# Patient Record
Sex: FEMALE | Race: WHITE | NOT HISPANIC OR LATINO | Employment: OTHER | URBAN - NONMETROPOLITAN AREA
[De-identification: names, ages, dates, MRNs, and addresses within clinical notes are randomized per-mention and may not be internally consistent; named-entity substitution may affect disease eponyms.]

---

## 2023-06-02 PROBLEM — I25.10 CAD (CORONARY ATHEROSCLEROTIC DISEASE): Status: ACTIVE | Noted: 2023-06-02

## 2023-06-02 PROBLEM — F10.11 HISTORY OF ALCOHOL ABUSE: Status: ACTIVE | Noted: 2023-06-02

## 2023-06-02 PROBLEM — I10 HYPERTENSION: Status: ACTIVE | Noted: 2023-06-02

## 2023-06-02 PROBLEM — R91.8 LUNG NODULES: Status: ACTIVE | Noted: 2023-06-02

## 2023-06-02 PROBLEM — F17.200 NICOTINE DEPENDENCE: Status: ACTIVE | Noted: 2023-06-02

## 2023-06-02 PROBLEM — E78.00 HYPERCHOLESTEREMIA: Status: ACTIVE | Noted: 2023-06-02

## 2023-06-02 PROBLEM — K70.10 ALCOHOLIC HEPATITIS WITHOUT ASCITES (MULTI): Status: ACTIVE | Noted: 2023-06-02

## 2023-06-02 PROBLEM — E55.9 VITAMIN D DEFICIENCY: Status: ACTIVE | Noted: 2023-06-02

## 2023-06-02 RX ORDER — CHLORTHALIDONE 50 MG/1
1 TABLET ORAL DAILY
COMMUNITY
Start: 2016-10-14 | End: 2023-06-07 | Stop reason: SDUPTHER

## 2023-06-02 RX ORDER — LOVASTATIN 20 MG/1
1 TABLET ORAL NIGHTLY
COMMUNITY
Start: 2022-05-09 | End: 2023-06-07 | Stop reason: SINTOL

## 2023-06-07 ENCOUNTER — OFFICE VISIT (OUTPATIENT)
Dept: PRIMARY CARE | Facility: CLINIC | Age: 68
End: 2023-06-07
Payer: MEDICARE

## 2023-06-07 VITALS
HEART RATE: 73 BPM | TEMPERATURE: 96.9 F | WEIGHT: 129.4 LBS | DIASTOLIC BLOOD PRESSURE: 62 MMHG | SYSTOLIC BLOOD PRESSURE: 122 MMHG | BODY MASS INDEX: 22.21 KG/M2 | OXYGEN SATURATION: 96 %

## 2023-06-07 DIAGNOSIS — F17.219 CIGARETTE NICOTINE DEPENDENCE WITH NICOTINE-INDUCED DISORDER: Primary | ICD-10-CM

## 2023-06-07 DIAGNOSIS — I25.10 CORONARY ATHEROSCLEROSIS DUE TO LIPID RICH PLAQUE: ICD-10-CM

## 2023-06-07 DIAGNOSIS — E78.00 HYPERCHOLESTEREMIA: ICD-10-CM

## 2023-06-07 DIAGNOSIS — I10 HYPERTENSION, UNSPECIFIED TYPE: ICD-10-CM

## 2023-06-07 DIAGNOSIS — I25.83 CORONARY ATHEROSCLEROSIS DUE TO LIPID RICH PLAQUE: ICD-10-CM

## 2023-06-07 DIAGNOSIS — Z12.11 SCREENING FOR COLON CANCER: ICD-10-CM

## 2023-06-07 DIAGNOSIS — R91.8 LUNG NODULES: ICD-10-CM

## 2023-06-07 DIAGNOSIS — Z78.9 STATIN INTOLERANCE: ICD-10-CM

## 2023-06-07 PROCEDURE — 99406 BEHAV CHNG SMOKING 3-10 MIN: CPT | Performed by: INTERNAL MEDICINE

## 2023-06-07 PROCEDURE — 1160F RVW MEDS BY RX/DR IN RCRD: CPT | Performed by: INTERNAL MEDICINE

## 2023-06-07 PROCEDURE — 3078F DIAST BP <80 MM HG: CPT | Performed by: INTERNAL MEDICINE

## 2023-06-07 PROCEDURE — 1159F MED LIST DOCD IN RCRD: CPT | Performed by: INTERNAL MEDICINE

## 2023-06-07 PROCEDURE — 3074F SYST BP LT 130 MM HG: CPT | Performed by: INTERNAL MEDICINE

## 2023-06-07 PROCEDURE — 99214 OFFICE O/P EST MOD 30 MIN: CPT | Performed by: INTERNAL MEDICINE

## 2023-06-07 RX ORDER — BRIMONIDINE TARTRATE AND TIMOLOL MALEATE 2; 5 MG/ML; MG/ML
1 SOLUTION OPHTHALMIC EVERY 12 HOURS SCHEDULED
COMMUNITY
Start: 2023-05-20 | End: 2023-10-04 | Stop reason: WASHOUT

## 2023-06-07 RX ORDER — EVOLOCUMAB 140 MG/ML
140 INJECTION, SOLUTION SUBCUTANEOUS
Qty: 2 ML | Refills: 11 | Status: SHIPPED | OUTPATIENT
Start: 2023-06-07 | End: 2023-12-07 | Stop reason: SDUPTHER

## 2023-06-07 RX ORDER — CHLORTHALIDONE 50 MG/1
50 TABLET ORAL DAILY
Qty: 90 TABLET | Refills: 1 | Status: SHIPPED | OUTPATIENT
Start: 2023-06-07 | End: 2023-10-04 | Stop reason: SINTOL

## 2023-06-07 ASSESSMENT — ENCOUNTER SYMPTOMS
ARTHRALGIAS: 1
SORE THROAT: 0
DIZZINESS: 0
HYPERTENSION: 1
DIFFICULTY URINATING: 0
FEVER: 0
PALPITATIONS: 0
DIARRHEA: 0
FATIGUE: 0
COUGH: 0
WHEEZING: 0
BRUISES/BLEEDS EASILY: 0
UNEXPECTED WEIGHT CHANGE: 0
BLOOD IN STOOL: 0
SINUS PAIN: 0
ABDOMINAL PAIN: 0
HEADACHES: 0

## 2023-06-07 NOTE — PROGRESS NOTES
"Subjective   Patient ID: Dasha Carvajal is a 67 y.o. female who presents for Hypertension, Hyperlipidemia, Coronary Artery Disease, and Medication Problem (Lovastatin caused all over muscle aches and nausea, would like to discuss repatha).    - Statin intolerance patient developed severe muscle weakness aches discontinuing medication lovastatin  Patient high risk coronary artery disease  - Patient need to start on Repatha new prescription provided sent to specialty pharmacy follow-up results closely  - Needs screening colonoscopy refer patient to gastroenterology  - Nicotine dependency  \"I spent 3 minutes counseling patient about need for smoking/tobacco cessation and how I can support efforts when patient is ready to quit. Discussed nicotine replacement therapy, Varenicline, Bupropion, hypnosis, support groups, and accupunture as potential options.  Patient currently has no signs or symptoms of tobacco related disease.\".  - Repeat low-dose CAT scan in August 2023, order placed today  -hypertension takes the chlorthalidone daily  -Reevaluate patient in 3 months arrange with physical exam next appointment    Hypertension  Pertinent negatives include no chest pain, headaches or palpitations.   Hyperlipidemia  Pertinent negatives include no chest pain.   Coronary Artery Disease  Pertinent negatives include no chest pain, dizziness, leg swelling or palpitations. Risk factors include hyperlipidemia.          Review of Systems   Constitutional:  Negative for fatigue, fever and unexpected weight change.   HENT:  Negative for congestion, ear discharge, ear pain, mouth sores, sinus pain and sore throat.    Eyes:  Negative for visual disturbance.   Respiratory:  Negative for cough and wheezing.    Cardiovascular:  Negative for chest pain, palpitations and leg swelling.   Gastrointestinal:  Negative for abdominal pain, blood in stool and diarrhea.   Genitourinary:  Negative for difficulty urinating.   Musculoskeletal:  " Positive for arthralgias.   Skin:  Negative for rash.   Neurological:  Negative for dizziness and headaches.   Hematological:  Does not bruise/bleed easily.   Psychiatric/Behavioral:  Negative for behavioral problems.    All other systems reviewed and are negative.      Objective   No results found for: HGBA1C   /62   Pulse 73   Temp 36.1 °C (96.9 °F)   Wt 58.7 kg (129 lb 6.4 oz)   SpO2 96%   BMI 22.21 kg/m²   Lab Results   Component Value Date    WBC 6.4 11/29/2022    HGB 12.8 11/29/2022    HCT 39.6 11/29/2022     11/29/2022    CHOL 268 (H) 11/29/2022    TRIG 82 11/29/2022    HDL 96.0 11/29/2022    ALT 12 11/29/2022    AST 21 11/29/2022     11/29/2022    K 3.7 11/29/2022     11/29/2022    CREATININE 0.97 11/29/2022    BUN 21 11/29/2022    CO2 29 11/29/2022    TSH 2.51 11/29/2022     par   Physical Exam  Vitals and nursing note reviewed.   Constitutional:       Appearance: Normal appearance.   HENT:      Head: Normocephalic.      Nose: Nose normal.   Eyes:      Conjunctiva/sclera: Conjunctivae normal.      Pupils: Pupils are equal, round, and reactive to light.   Cardiovascular:      Rate and Rhythm: Regular rhythm.   Pulmonary:      Effort: Pulmonary effort is normal.      Breath sounds: Normal breath sounds.   Abdominal:      General: Abdomen is flat.      Palpations: Abdomen is soft.   Musculoskeletal:      Cervical back: Neck supple.   Skin:     General: Skin is warm.   Neurological:      General: No focal deficit present.      Mental Status: She is oriented to person, place, and time.   Psychiatric:         Mood and Affect: Mood normal.         Assessment/Plan   Dasha was seen today for hypertension, hyperlipidemia, coronary artery disease and medication problem.  Diagnoses and all orders for this visit:  Cigarette nicotine dependence with nicotine-induced disorder (Primary)  -     CT lung screening low dose; Future  Hypertension, unspecified type  -     chlorthalidone  "(Hygroton) 50 mg tablet; Take 1 tablet (50 mg) by mouth once daily.  Lung nodules  -     CT lung screening low dose; Future  Hypercholesteremia  -     evolocumab (Repatha SureClick) 140 mg/mL injection; Inject 1 mL (140 mg) under the skin every 14 (fourteen) days.  Coronary atherosclerosis due to lipid rich plaque  -     evolocumab (Repatha SureClick) 140 mg/mL injection; Inject 1 mL (140 mg) under the skin every 14 (fourteen) days.  Statin intolerance  -     evolocumab (Repatha SureClick) 140 mg/mL injection; Inject 1 mL (140 mg) under the skin every 14 (fourteen) days.  Screening for colon cancer  -     Referral to Gastroenterology; Future  Other orders  -     Follow Up In Primary Care; Future   - Statin intolerance patient developed severe muscle weakness aches discontinuing medication lovastatin  Patient high risk coronary artery disease  - Patient need to start on Repatha new prescription provided sent to specialty pharmacy follow-up results closely  - Needs screening colonoscopy refer patient to gastroenterology  - Nicotine dependency  \"I spent 3 minutes counseling patient about need for smoking/tobacco cessation and how I can support efforts when patient is ready to quit. Discussed nicotine replacement therapy, Varenicline, Bupropion, hypnosis, support groups, and accupunture as potential options.  Patient currently has no signs or symptoms of tobacco related disease.\".  - Repeat low-dose CAT scan in August 2023, order placed today  -hypertension takes the chlorthalidone daily  -Reevaluate patient in 3 months arrange with physical exam next appointment  "

## 2023-07-10 DIAGNOSIS — E78.00 HYPERCHOLESTEREMIA: ICD-10-CM

## 2023-07-12 ENCOUNTER — LAB (OUTPATIENT)
Dept: LAB | Facility: LAB | Age: 68
End: 2023-07-12
Payer: MEDICARE

## 2023-07-12 DIAGNOSIS — E78.00 HYPERCHOLESTEREMIA: ICD-10-CM

## 2023-07-12 LAB
CHOLESTEROL (MG/DL) IN SER/PLAS: 236 MG/DL (ref 0–199)
CHOLESTEROL IN HDL (MG/DL) IN SER/PLAS: 82.2 MG/DL
CHOLESTEROL/HDL RATIO: 2.9
LDL: 131 MG/DL (ref 0–99)
TRIGLYCERIDE (MG/DL) IN SER/PLAS: 116 MG/DL (ref 0–149)
VLDL: 23 MG/DL (ref 0–40)

## 2023-07-12 PROCEDURE — 36415 COLL VENOUS BLD VENIPUNCTURE: CPT

## 2023-07-12 PROCEDURE — 80061 LIPID PANEL: CPT

## 2023-09-07 ENCOUNTER — APPOINTMENT (OUTPATIENT)
Dept: PRIMARY CARE | Facility: CLINIC | Age: 68
End: 2023-09-07
Payer: MEDICARE

## 2023-10-04 ENCOUNTER — OFFICE VISIT (OUTPATIENT)
Dept: PRIMARY CARE | Facility: CLINIC | Age: 68
End: 2023-10-04
Payer: MEDICARE

## 2023-10-04 VITALS
WEIGHT: 132.6 LBS | OXYGEN SATURATION: 98 % | BODY MASS INDEX: 22.76 KG/M2 | DIASTOLIC BLOOD PRESSURE: 84 MMHG | HEART RATE: 64 BPM | TEMPERATURE: 96.7 F | SYSTOLIC BLOOD PRESSURE: 154 MMHG

## 2023-10-04 DIAGNOSIS — Z79.899 HIGH RISK MEDICATION USE: ICD-10-CM

## 2023-10-04 DIAGNOSIS — I25.83 CORONARY ATHEROSCLEROSIS DUE TO LIPID RICH PLAQUE: ICD-10-CM

## 2023-10-04 DIAGNOSIS — E53.8 VITAMIN B12 DEFICIENCY: ICD-10-CM

## 2023-10-04 DIAGNOSIS — F17.219 CIGARETTE NICOTINE DEPENDENCE WITH NICOTINE-INDUCED DISORDER: ICD-10-CM

## 2023-10-04 DIAGNOSIS — R91.8 LUNG NODULES: ICD-10-CM

## 2023-10-04 DIAGNOSIS — I25.10 CORONARY ATHEROSCLEROSIS DUE TO LIPID RICH PLAQUE: ICD-10-CM

## 2023-10-04 DIAGNOSIS — Z78.9 STATIN INTOLERANCE: ICD-10-CM

## 2023-10-04 DIAGNOSIS — I10 HYPERTENSION, UNSPECIFIED TYPE: Primary | ICD-10-CM

## 2023-10-04 DIAGNOSIS — E78.00 HYPERCHOLESTEREMIA: ICD-10-CM

## 2023-10-04 DIAGNOSIS — Z12.31 ENCOUNTER FOR SCREENING MAMMOGRAM FOR MALIGNANT NEOPLASM OF BREAST: ICD-10-CM

## 2023-10-04 DIAGNOSIS — R53.83 OTHER FATIGUE: ICD-10-CM

## 2023-10-04 DIAGNOSIS — Z78.0 MENOPAUSE: ICD-10-CM

## 2023-10-04 PROCEDURE — 1159F MED LIST DOCD IN RCRD: CPT | Performed by: INTERNAL MEDICINE

## 2023-10-04 PROCEDURE — 1160F RVW MEDS BY RX/DR IN RCRD: CPT | Performed by: INTERNAL MEDICINE

## 2023-10-04 PROCEDURE — 3079F DIAST BP 80-89 MM HG: CPT | Performed by: INTERNAL MEDICINE

## 2023-10-04 PROCEDURE — 99406 BEHAV CHNG SMOKING 3-10 MIN: CPT | Performed by: INTERNAL MEDICINE

## 2023-10-04 PROCEDURE — 99214 OFFICE O/P EST MOD 30 MIN: CPT | Performed by: INTERNAL MEDICINE

## 2023-10-04 PROCEDURE — 3077F SYST BP >= 140 MM HG: CPT | Performed by: INTERNAL MEDICINE

## 2023-10-04 RX ORDER — DORZOLAMIDE HYDROCHLORIDE AND TIMOLOL MALEATE 20; 5 MG/ML; MG/ML
SOLUTION/ DROPS OPHTHALMIC
COMMUNITY
Start: 2023-09-25

## 2023-10-04 RX ORDER — LOSARTAN POTASSIUM AND HYDROCHLOROTHIAZIDE 12.5; 5 MG/1; MG/1
1 TABLET ORAL DAILY
Qty: 90 TABLET | Refills: 3 | Status: SHIPPED | OUTPATIENT
Start: 2023-10-04 | End: 2024-10-03

## 2023-10-04 ASSESSMENT — ENCOUNTER SYMPTOMS
COUGH: 0
PALPITATIONS: 0
DIZZINESS: 0
ABDOMINAL PAIN: 0
HYPERTENSION: 1
SINUS PAIN: 0
DIARRHEA: 0
BLOOD IN STOOL: 0
FEVER: 0
WHEEZING: 0
DIFFICULTY URINATING: 0
HEADACHES: 0
UNEXPECTED WEIGHT CHANGE: 0
SORE THROAT: 0
FATIGUE: 0
BRUISES/BLEEDS EASILY: 0
ARTHRALGIAS: 0

## 2023-10-04 NOTE — PROGRESS NOTES
"Subjective   Patient ID: Dasha Carvajal is a 67 y.o. female who presents for Hypertension, Hyperlipidemia, Coronary Artery Disease, and Flu Vaccine (Decline).    - Statin intolerance, patient now doing very well takes Repatha no complaints  Need to proceed with lipid profile  Patient developed severe muscle weakness aches discontinuing medication lovastatin  Patient high risk coronary artery disease  -Not taking her blood pressure medication because causing her leg cramps patient counseled about discontinuing chlorthalidone  Switch patient to losartan hydrochlorothiazide 50/12.5 mg daily  - Needs screening colonoscopy refer patient to gastroenterology  - Nicotine dependency  \"I spent 3 minutes counseling patient about need for smoking/tobacco cessation and how I can support efforts when patient is ready to quit. Discussed nicotine replacement therapy, Varenicline, Bupropion, hypnosis, support groups, and accupunture as potential options.  Patient currently has no signs or symptoms of tobacco related disease.\".  -Lung nodule, repeat low-dose CAT scan in July 2024, order placed today  -Needs bone densitometry  Medicare physical next appointment  Patient declined flu vaccine    Hypertension  Pertinent negatives include no chest pain, headaches or palpitations.   Hyperlipidemia  Pertinent negatives include no chest pain.   Coronary Artery Disease  Pertinent negatives include no chest pain, dizziness, leg swelling or palpitations. Risk factors include hyperlipidemia.          Review of Systems   Constitutional:  Negative for fatigue, fever and unexpected weight change.   HENT:  Negative for congestion, ear discharge, ear pain, mouth sores, sinus pain and sore throat.    Eyes:  Negative for visual disturbance.   Respiratory:  Negative for cough and wheezing.    Cardiovascular:  Negative for chest pain, palpitations and leg swelling.   Gastrointestinal:  Negative for abdominal pain, blood in stool and diarrhea. " "  Genitourinary:  Negative for difficulty urinating.   Musculoskeletal:  Negative for arthralgias.   Skin:  Negative for rash.   Neurological:  Negative for dizziness and headaches.   Hematological:  Does not bruise/bleed easily.   Psychiatric/Behavioral:  Negative for behavioral problems.    All other systems reviewed and are negative.      Objective   No results found for: \"HGBA1C\"   /84   Pulse 64   Temp 35.9 °C (96.7 °F)   Wt 60.1 kg (132 lb 9.6 oz)   SpO2 98%   BMI 22.76 kg/m²   Lab Results   Component Value Date    WBC 6.4 11/29/2022    HGB 12.8 11/29/2022    HCT 39.6 11/29/2022     11/29/2022    CHOL 236 (H) 07/12/2023    TRIG 116 07/12/2023    HDL 82.2 07/12/2023    ALT 12 11/29/2022    AST 21 11/29/2022     11/29/2022    K 3.7 11/29/2022     11/29/2022    CREATININE 0.97 11/29/2022    BUN 21 11/29/2022    CO2 29 11/29/2022    TSH 2.51 11/29/2022     par   Physical Exam  Vitals and nursing note reviewed.   Constitutional:       Appearance: Normal appearance.   HENT:      Head: Normocephalic.      Nose: Nose normal.   Eyes:      Conjunctiva/sclera: Conjunctivae normal.      Pupils: Pupils are equal, round, and reactive to light.   Cardiovascular:      Rate and Rhythm: Regular rhythm.   Pulmonary:      Effort: Pulmonary effort is normal.      Breath sounds: Normal breath sounds.   Abdominal:      General: Abdomen is flat.      Palpations: Abdomen is soft.   Musculoskeletal:      Cervical back: Neck supple.   Skin:     General: Skin is warm.   Neurological:      General: No focal deficit present.      Mental Status: She is oriented to person, place, and time.   Psychiatric:         Mood and Affect: Mood normal.         Assessment/Plan   Dasha was seen today for hypertension, hyperlipidemia, coronary artery disease and flu vaccine.  Diagnoses and all orders for this visit:  Hypertension, unspecified type (Primary)  -     losartan-hydrochlorothiazide (Hyzaar) 50-12.5 mg tablet; " "Take 1 tablet by mouth once daily.  -     Comprehensive Metabolic Panel; Future  High risk medication use  -     CBC and Auto Differential; Future  Hypercholesteremia  -     Lipid Panel; Future  Other fatigue  -     TSH with reflex to Free T4 if abnormal; Future  Vitamin B12 deficiency  -     Vitamin B12; Future  Menopause  -     XR DEXA bone density; Future  Encounter for screening mammogram for malignant neoplasm of breast  -     BI mammo bilateral screening tomosynthesis; Future  Lung nodules  Statin intolerance  Coronary atherosclerosis due to lipid rich plaque  Cigarette nicotine dependence with nicotine-induced disorder  Other orders  -     Follow Up In Primary Care - Medicare Annual; Future   - Statin intolerance, patient now doing very well takes Repatha no complaints  Need to proceed with lipid profile  Patient developed severe muscle weakness aches discontinuing medication lovastatin  Patient high risk coronary artery disease  -Not taking her blood pressure medication because causing her leg cramps patient counseled about discontinuing chlorthalidone  Switch patient to losartan hydrochlorothiazide 50/12.5 mg daily  - Needs screening colonoscopy refer patient to gastroenterology  - Nicotine dependency  \"I spent 3 minutes counseling patient about need for smoking/tobacco cessation and how I can support efforts when patient is ready to quit. Discussed nicotine replacement therapy, Varenicline, Bupropion, hypnosis, support groups, and accupunture as potential options.  Patient currently has no signs or symptoms of tobacco related disease.\".  -Lung nodule, repeat low-dose CAT scan in July 2024, order placed today  -Needs bone densitometry  Medicare physical next appointment  Patient declined flu vaccine  "

## 2023-10-24 ENCOUNTER — SPECIALTY PHARMACY (OUTPATIENT)
Dept: PHARMACY | Facility: CLINIC | Age: 68
End: 2023-10-24

## 2023-10-24 DIAGNOSIS — Z78.9 STATIN INTOLERANCE: ICD-10-CM

## 2023-10-24 RX ORDER — EVOLOCUMAB 140 MG/ML
INJECTION, SOLUTION SUBCUTANEOUS
Qty: 2 ML | Refills: 11 | Status: SHIPPED | OUTPATIENT
Start: 2023-10-24 | End: 2024-10-23

## 2023-10-26 ENCOUNTER — SPECIALTY PHARMACY (OUTPATIENT)
Dept: PHARMACY | Facility: CLINIC | Age: 68
End: 2023-10-26

## 2023-10-26 ENCOUNTER — PHARMACY VISIT (OUTPATIENT)
Dept: PHARMACY | Facility: CLINIC | Age: 68
End: 2023-10-26
Payer: MEDICARE

## 2023-10-26 PROCEDURE — RXMED WILLOW AMBULATORY MEDICATION CHARGE

## 2023-11-21 ENCOUNTER — PHARMACY VISIT (OUTPATIENT)
Dept: PHARMACY | Facility: CLINIC | Age: 68
End: 2023-11-21
Payer: MEDICARE

## 2023-11-21 PROCEDURE — RXMED WILLOW AMBULATORY MEDICATION CHARGE

## 2023-12-04 ENCOUNTER — LAB (OUTPATIENT)
Dept: LAB | Facility: LAB | Age: 68
End: 2023-12-04
Payer: MEDICARE

## 2023-12-04 DIAGNOSIS — I10 HYPERTENSION, UNSPECIFIED TYPE: ICD-10-CM

## 2023-12-04 DIAGNOSIS — E78.00 HYPERCHOLESTEREMIA: ICD-10-CM

## 2023-12-04 DIAGNOSIS — Z79.899 HIGH RISK MEDICATION USE: ICD-10-CM

## 2023-12-04 DIAGNOSIS — R53.83 OTHER FATIGUE: ICD-10-CM

## 2023-12-04 DIAGNOSIS — E53.8 VITAMIN B12 DEFICIENCY: ICD-10-CM

## 2023-12-04 LAB
ALBUMIN SERPL BCP-MCNC: 4.5 G/DL (ref 3.4–5)
ALP SERPL-CCNC: 75 U/L (ref 33–136)
ALT SERPL W P-5'-P-CCNC: 13 U/L (ref 7–45)
ANION GAP SERPL CALC-SCNC: 13 MMOL/L (ref 10–20)
AST SERPL W P-5'-P-CCNC: 20 U/L (ref 9–39)
BASOPHILS # BLD AUTO: 0.05 X10*3/UL (ref 0–0.1)
BASOPHILS NFR BLD AUTO: 0.6 %
BILIRUB SERPL-MCNC: 0.4 MG/DL (ref 0–1.2)
BUN SERPL-MCNC: 17 MG/DL (ref 6–23)
CALCIUM SERPL-MCNC: 9.5 MG/DL (ref 8.6–10.3)
CHLORIDE SERPL-SCNC: 102 MMOL/L (ref 98–107)
CHOLEST SERPL-MCNC: 149 MG/DL (ref 0–199)
CHOLESTEROL/HDL RATIO: 1.7
CO2 SERPL-SCNC: 28 MMOL/L (ref 21–32)
CREAT SERPL-MCNC: 0.87 MG/DL (ref 0.5–1.05)
EOSINOPHIL # BLD AUTO: 0.06 X10*3/UL (ref 0–0.7)
EOSINOPHIL NFR BLD AUTO: 0.7 %
ERYTHROCYTE [DISTWIDTH] IN BLOOD BY AUTOMATED COUNT: 12.1 % (ref 11.5–14.5)
GFR SERPL CREATININE-BSD FRML MDRD: 72 ML/MIN/1.73M*2
GLUCOSE SERPL-MCNC: 87 MG/DL (ref 74–99)
HCT VFR BLD AUTO: 41.2 % (ref 36–46)
HDLC SERPL-MCNC: 90.1 MG/DL
HGB BLD-MCNC: 13.5 G/DL (ref 12–16)
IMM GRANULOCYTES # BLD AUTO: 0.02 X10*3/UL (ref 0–0.7)
IMM GRANULOCYTES NFR BLD AUTO: 0.2 % (ref 0–0.9)
LDLC SERPL CALC-MCNC: 35 MG/DL
LYMPHOCYTES # BLD AUTO: 3.57 X10*3/UL (ref 1.2–4.8)
LYMPHOCYTES NFR BLD AUTO: 41.4 %
MCH RBC QN AUTO: 33.1 PG (ref 26–34)
MCHC RBC AUTO-ENTMCNC: 32.8 G/DL (ref 32–36)
MCV RBC AUTO: 101 FL (ref 80–100)
MONOCYTES # BLD AUTO: 0.59 X10*3/UL (ref 0.1–1)
MONOCYTES NFR BLD AUTO: 6.8 %
NEUTROPHILS # BLD AUTO: 4.33 X10*3/UL (ref 1.2–7.7)
NEUTROPHILS NFR BLD AUTO: 50.3 %
NON HDL CHOLESTEROL: 59 MG/DL (ref 0–149)
NRBC BLD-RTO: 0 /100 WBCS (ref 0–0)
PLATELET # BLD AUTO: 405 X10*3/UL (ref 150–450)
POTASSIUM SERPL-SCNC: 4.8 MMOL/L (ref 3.5–5.3)
PROT SERPL-MCNC: 7.4 G/DL (ref 6.4–8.2)
RBC # BLD AUTO: 4.08 X10*6/UL (ref 4–5.2)
SODIUM SERPL-SCNC: 138 MMOL/L (ref 136–145)
T4 FREE SERPL-MCNC: 0.46 NG/DL (ref 0.61–1.12)
TRIGL SERPL-MCNC: 120 MG/DL (ref 0–149)
TSH SERPL-ACNC: 4.78 MIU/L (ref 0.44–3.98)
VIT B12 SERPL-MCNC: 344 PG/ML (ref 211–911)
VLDL: 24 MG/DL (ref 0–40)
WBC # BLD AUTO: 8.6 X10*3/UL (ref 4.4–11.3)

## 2023-12-04 PROCEDURE — 36415 COLL VENOUS BLD VENIPUNCTURE: CPT

## 2023-12-04 PROCEDURE — 82607 VITAMIN B-12: CPT

## 2023-12-07 ENCOUNTER — OFFICE VISIT (OUTPATIENT)
Dept: PRIMARY CARE | Facility: CLINIC | Age: 68
End: 2023-12-07
Payer: MEDICARE

## 2023-12-07 VITALS
HEART RATE: 72 BPM | WEIGHT: 133.8 LBS | DIASTOLIC BLOOD PRESSURE: 78 MMHG | TEMPERATURE: 96.5 F | HEIGHT: 64 IN | SYSTOLIC BLOOD PRESSURE: 138 MMHG | BODY MASS INDEX: 22.84 KG/M2 | OXYGEN SATURATION: 96 %

## 2023-12-07 DIAGNOSIS — I25.10 CORONARY ATHEROSCLEROSIS DUE TO LIPID RICH PLAQUE: ICD-10-CM

## 2023-12-07 DIAGNOSIS — I25.83 CORONARY ATHEROSCLEROSIS DUE TO LIPID RICH PLAQUE: ICD-10-CM

## 2023-12-07 DIAGNOSIS — E03.9 HYPOTHYROIDISM, UNSPECIFIED TYPE: Primary | ICD-10-CM

## 2023-12-07 DIAGNOSIS — Z78.9 STATIN INTOLERANCE: ICD-10-CM

## 2023-12-07 DIAGNOSIS — Z13.89 SCREENING FOR MULTIPLE CONDITIONS: ICD-10-CM

## 2023-12-07 DIAGNOSIS — Z00.00 ROUTINE GENERAL MEDICAL EXAMINATION AT HEALTH CARE FACILITY: ICD-10-CM

## 2023-12-07 DIAGNOSIS — R91.8 LUNG NODULES: ICD-10-CM

## 2023-12-07 DIAGNOSIS — R53.83 OTHER FATIGUE: ICD-10-CM

## 2023-12-07 DIAGNOSIS — F17.219 CIGARETTE NICOTINE DEPENDENCE WITH NICOTINE-INDUCED DISORDER: ICD-10-CM

## 2023-12-07 DIAGNOSIS — E78.00 HYPERCHOLESTEREMIA: ICD-10-CM

## 2023-12-07 PROCEDURE — 1160F RVW MEDS BY RX/DR IN RCRD: CPT | Performed by: INTERNAL MEDICINE

## 2023-12-07 PROCEDURE — 3078F DIAST BP <80 MM HG: CPT | Performed by: INTERNAL MEDICINE

## 2023-12-07 PROCEDURE — 3075F SYST BP GE 130 - 139MM HG: CPT | Performed by: INTERNAL MEDICINE

## 2023-12-07 PROCEDURE — 1159F MED LIST DOCD IN RCRD: CPT | Performed by: INTERNAL MEDICINE

## 2023-12-07 PROCEDURE — 99214 OFFICE O/P EST MOD 30 MIN: CPT | Performed by: INTERNAL MEDICINE

## 2023-12-07 PROCEDURE — 1170F FXNL STATUS ASSESSED: CPT | Performed by: INTERNAL MEDICINE

## 2023-12-07 PROCEDURE — 99406 BEHAV CHNG SMOKING 3-10 MIN: CPT | Performed by: INTERNAL MEDICINE

## 2023-12-07 PROCEDURE — G0439 PPPS, SUBSEQ VISIT: HCPCS | Performed by: INTERNAL MEDICINE

## 2023-12-07 RX ORDER — LEVOTHYROXINE SODIUM 25 UG/1
25 TABLET ORAL DAILY
Qty: 90 TABLET | Refills: 1 | Status: SHIPPED | OUTPATIENT
Start: 2023-12-07 | End: 2024-04-09 | Stop reason: SDUPTHER

## 2023-12-07 ASSESSMENT — PATIENT HEALTH QUESTIONNAIRE - PHQ9
SUM OF ALL RESPONSES TO PHQ9 QUESTIONS 1 AND 2: 0
1. LITTLE INTEREST OR PLEASURE IN DOING THINGS: NOT AT ALL
2. FEELING DOWN, DEPRESSED OR HOPELESS: NOT AT ALL

## 2023-12-07 ASSESSMENT — ENCOUNTER SYMPTOMS
COUGH: 0
DIZZINESS: 0
UNEXPECTED WEIGHT CHANGE: 0
SORE THROAT: 0
FATIGUE: 1
ABDOMINAL PAIN: 0
DIFFICULTY URINATING: 0
DIARRHEA: 0
HYPERTENSION: 1
PALPITATIONS: 0
BRUISES/BLEEDS EASILY: 0
OCCASIONAL FEELINGS OF UNSTEADINESS: 0
ARTHRALGIAS: 0
DEPRESSION: 0
BLOOD IN STOOL: 0
HEADACHES: 0
LOSS OF SENSATION IN FEET: 0
FEVER: 0
WHEEZING: 0
SINUS PAIN: 0

## 2023-12-07 ASSESSMENT — ACTIVITIES OF DAILY LIVING (ADL)
TAKING_MEDICATION: INDEPENDENT
MANAGING_FINANCES: INDEPENDENT
BATHING: INDEPENDENT
DOING_HOUSEWORK: INDEPENDENT
GROCERY_SHOPPING: INDEPENDENT
DRESSING: INDEPENDENT

## 2023-12-07 NOTE — PROGRESS NOTES
"Subjective   Patient ID: Dasha Carvajal is a 68 y.o. female who presents for Hypertension (Some days bp is high), Medicare Annual Wellness Visit Subsequent, and Results.    HPI       Review of Systems    Objective   No results found for: \"HGBA1C\"   /78   Pulse 72   Temp 35.8 °C (96.5 °F)   Ht 1.626 m (5' 4\")   Wt 60.7 kg (133 lb 12.8 oz)   SpO2 96%   BMI 22.97 kg/m²     Physical Exam    Assessment/Plan   There are no diagnoses linked to this encounter.   "

## 2023-12-07 NOTE — PROGRESS NOTES
"Subjective   Reason for Visit: Dasha Carvajal is an 68 y.o. female here for a Medicare Wellness visit.     Past Medical, Surgical, and Family History reviewed and updated in chart.    Reviewed all medications by prescribing practitioner or clinical pharmacist (such as prescriptions, OTCs, herbal therapies and supplements) and documented in the medical record.    Annual preventive visit  -Vaccination patient declined vaccines aware of risk and benefit  -Screening mammogram obtained and up-to-date  -Need to proceed with screening colonoscopy as recommended order placed in the EMR  -Need to proceed with osteoporosis screening as recommended order placed continue with calcium with vitamin D  - Recent blood work reviewed with patient  - Nicotine dependency  Counseled about smoking cessation \"I spent more 3 minutes counseling patient about need for smoking/tobacco cessation and how I can support efforts when patient is ready to quit.  Discussed nicotine replacement therapy, Varenicline, Bupropion, hypnosis, support groups, and accupunture as potential options.  Patient currently has no signs or symptoms of tobacco related disease.\"  -Screening for lung cancer obtained September 2023 need to repeat in September 2024 follow-up lung nodule  Follow-up  - Hypercholesterolemia,.- Statin intolerance, patient now doing very well takes Repatha no complaints lipid profile improving continue with low-fat diet  -New onset hypothyroid patient will be started on levothyroxine 25 mcg daily repeat thyroid function test in 3 months before next appointment  -Hypertension now improved to continue with losartan hydrochlorothiazide no muscle cramping doing well      Hypertension  Pertinent negatives include no chest pain, headaches or palpitations.       Patient Care Team:  Renee Briscoe MD as PCP - General  Renee Briscoe MD [facility] PCP [facility]     Review of Systems   Constitutional:  Positive for fatigue. " "Negative for fever and unexpected weight change.   HENT:  Negative for congestion, ear discharge, ear pain, mouth sores, sinus pain and sore throat.    Eyes:  Negative for visual disturbance.   Respiratory:  Negative for cough and wheezing.    Cardiovascular:  Negative for chest pain, palpitations and leg swelling.   Gastrointestinal:  Negative for abdominal pain, blood in stool and diarrhea.   Genitourinary:  Negative for difficulty urinating.   Musculoskeletal:  Negative for arthralgias.   Skin:  Negative for rash.   Neurological:  Negative for dizziness and headaches.   Hematological:  Does not bruise/bleed easily.   Psychiatric/Behavioral:  Negative for behavioral problems.    All other systems reviewed and are negative.      Objective   Vitals:  /78   Pulse 72   Temp 35.8 °C (96.5 °F)   Ht 1.626 m (5' 4\")   Wt 60.7 kg (133 lb 12.8 oz)   SpO2 96%   BMI 22.97 kg/m²     Lab Results   Component Value Date    WBC 8.6 12/04/2023    HGB 13.5 12/04/2023    HCT 41.2 12/04/2023     12/04/2023    CHOL 149 12/04/2023    TRIG 120 12/04/2023    HDL 90.1 12/04/2023    ALT 13 12/04/2023    AST 20 12/04/2023     12/04/2023    K 4.8 12/04/2023     12/04/2023    CREATININE 0.87 12/04/2023    BUN 17 12/04/2023    CO2 28 12/04/2023    TSH 4.78 (H) 12/04/2023     par   Physical Exam  Vitals and nursing note reviewed.   Constitutional:       Appearance: Normal appearance.   HENT:      Head: Normocephalic.      Nose: Nose normal.   Eyes:      Conjunctiva/sclera: Conjunctivae normal.      Pupils: Pupils are equal, round, and reactive to light.   Cardiovascular:      Rate and Rhythm: Regular rhythm.   Pulmonary:      Effort: Pulmonary effort is normal.      Breath sounds: Normal breath sounds.   Abdominal:      General: Abdomen is flat.      Palpations: Abdomen is soft.   Musculoskeletal:      Cervical back: Neck supple.   Skin:     General: Skin is warm.   Neurological:      General: No focal deficit " "present.      Mental Status: She is oriented to person, place, and time.   Psychiatric:         Mood and Affect: Mood normal.         Assessment/Plan   Problem List Items Addressed This Visit       CAD (coronary atherosclerotic disease)    Hypercholesteremia    Lung nodules    Nicotine dependence    Statin intolerance     Other Visit Diagnoses       Hypothyroidism, unspecified type    -  Primary    Relevant Medications    levothyroxine (Synthroid, Levoxyl) 25 mcg tablet    Other Relevant Orders    TSH with reflex to Free T4 if abnormal    Other fatigue        Relevant Orders    TSH with reflex to Free T4 if abnormal    Screening for multiple conditions        Routine general medical examination at health care facility              Annual preventive visit  -Vaccination patient declined vaccines aware of risk and benefit  -Screening mammogram obtained and up-to-date  -Need to proceed with screening colonoscopy as recommended order placed in the EMR  -Need to proceed with osteoporosis screening as recommended order placed continue with calcium with vitamin D  - Recent blood work reviewed with patient  - Nicotine dependency  Counseled about smoking cessation \"I spent more 3 minutes counseling patient about need for smoking/tobacco cessation and how I can support efforts when patient is ready to quit.  Discussed nicotine replacement therapy, Varenicline, Bupropion, hypnosis, support groups, and accupunture as potential options.  Patient currently has no signs or symptoms of tobacco related disease.\"  -Screening for lung cancer obtained September 2023 need to repeat in September 2024 follow-up lung nodule  Follow-up  - Hypercholesterolemia,.- Statin intolerance, patient now doing very well takes Repatha no complaints lipid profile improving continue with low-fat diet  -New onset hypothyroid patient will be started on levothyroxine 25 mcg daily repeat thyroid function test in 3 months before next appointment  -Hypertension " now improved to continue with losartan hydrochlorothiazide no muscle cramping doing well

## 2023-12-15 ENCOUNTER — SPECIALTY PHARMACY (OUTPATIENT)
Dept: PHARMACY | Facility: CLINIC | Age: 68
End: 2023-12-15

## 2023-12-15 PROCEDURE — RXMED WILLOW AMBULATORY MEDICATION CHARGE

## 2023-12-22 ENCOUNTER — PHARMACY VISIT (OUTPATIENT)
Dept: PHARMACY | Facility: CLINIC | Age: 68
End: 2023-12-22
Payer: MEDICARE

## 2024-01-09 ENCOUNTER — SPECIALTY PHARMACY (OUTPATIENT)
Dept: PHARMACY | Facility: CLINIC | Age: 69
End: 2024-01-09

## 2024-01-09 PROCEDURE — RXMED WILLOW AMBULATORY MEDICATION CHARGE

## 2024-01-19 ENCOUNTER — PHARMACY VISIT (OUTPATIENT)
Dept: PHARMACY | Facility: CLINIC | Age: 69
End: 2024-01-19
Payer: MEDICARE

## 2024-02-15 ENCOUNTER — SPECIALTY PHARMACY (OUTPATIENT)
Dept: PHARMACY | Facility: CLINIC | Age: 69
End: 2024-02-15

## 2024-02-15 PROCEDURE — RXMED WILLOW AMBULATORY MEDICATION CHARGE

## 2024-02-20 ENCOUNTER — PHARMACY VISIT (OUTPATIENT)
Dept: PHARMACY | Facility: CLINIC | Age: 69
End: 2024-02-20
Payer: MEDICARE

## 2024-03-15 ENCOUNTER — SPECIALTY PHARMACY (OUTPATIENT)
Dept: PHARMACY | Facility: CLINIC | Age: 69
End: 2024-03-15

## 2024-03-15 PROCEDURE — RXMED WILLOW AMBULATORY MEDICATION CHARGE

## 2024-03-22 ENCOUNTER — PHARMACY VISIT (OUTPATIENT)
Dept: PHARMACY | Facility: CLINIC | Age: 69
End: 2024-03-22
Payer: MEDICARE

## 2024-03-27 ENCOUNTER — LAB (OUTPATIENT)
Dept: LAB | Facility: LAB | Age: 69
End: 2024-03-27
Payer: MEDICARE

## 2024-03-27 DIAGNOSIS — R53.83 OTHER FATIGUE: ICD-10-CM

## 2024-03-27 DIAGNOSIS — E03.9 HYPOTHYROIDISM, UNSPECIFIED TYPE: ICD-10-CM

## 2024-03-27 LAB — TSH SERPL-ACNC: 2.13 MIU/L (ref 0.44–3.98)

## 2024-03-27 PROCEDURE — 36415 COLL VENOUS BLD VENIPUNCTURE: CPT

## 2024-04-09 ENCOUNTER — OFFICE VISIT (OUTPATIENT)
Dept: PRIMARY CARE | Facility: CLINIC | Age: 69
End: 2024-04-09
Payer: MEDICARE

## 2024-04-09 VITALS
OXYGEN SATURATION: 95 % | DIASTOLIC BLOOD PRESSURE: 82 MMHG | SYSTOLIC BLOOD PRESSURE: 146 MMHG | BODY MASS INDEX: 22.55 KG/M2 | WEIGHT: 131.4 LBS | HEART RATE: 73 BPM | TEMPERATURE: 96.8 F

## 2024-04-09 DIAGNOSIS — I10 HYPERTENSION, UNSPECIFIED TYPE: ICD-10-CM

## 2024-04-09 DIAGNOSIS — K70.10 ALCOHOLIC HEPATITIS WITHOUT ASCITES (MULTI): ICD-10-CM

## 2024-04-09 DIAGNOSIS — I25.83 CORONARY ATHEROSCLEROSIS DUE TO LIPID RICH PLAQUE: ICD-10-CM

## 2024-04-09 DIAGNOSIS — E78.00 HYPERCHOLESTEREMIA: ICD-10-CM

## 2024-04-09 DIAGNOSIS — Z12.11 SCREENING FOR COLON CANCER: ICD-10-CM

## 2024-04-09 DIAGNOSIS — F17.219 CIGARETTE NICOTINE DEPENDENCE WITH NICOTINE-INDUCED DISORDER: ICD-10-CM

## 2024-04-09 DIAGNOSIS — I25.10 CORONARY ATHEROSCLEROSIS DUE TO LIPID RICH PLAQUE: ICD-10-CM

## 2024-04-09 DIAGNOSIS — E03.9 HYPOTHYROIDISM, UNSPECIFIED TYPE: Primary | ICD-10-CM

## 2024-04-09 PROCEDURE — 3077F SYST BP >= 140 MM HG: CPT | Performed by: INTERNAL MEDICINE

## 2024-04-09 PROCEDURE — 99214 OFFICE O/P EST MOD 30 MIN: CPT | Performed by: INTERNAL MEDICINE

## 2024-04-09 PROCEDURE — 1160F RVW MEDS BY RX/DR IN RCRD: CPT | Performed by: INTERNAL MEDICINE

## 2024-04-09 PROCEDURE — 99406 BEHAV CHNG SMOKING 3-10 MIN: CPT | Performed by: INTERNAL MEDICINE

## 2024-04-09 PROCEDURE — 3079F DIAST BP 80-89 MM HG: CPT | Performed by: INTERNAL MEDICINE

## 2024-04-09 PROCEDURE — 1159F MED LIST DOCD IN RCRD: CPT | Performed by: INTERNAL MEDICINE

## 2024-04-09 RX ORDER — LEVOTHYROXINE SODIUM 25 UG/1
25 TABLET ORAL DAILY
Qty: 90 TABLET | Refills: 1 | Status: SHIPPED | OUTPATIENT
Start: 2024-04-09 | End: 2024-05-20

## 2024-04-09 ASSESSMENT — ENCOUNTER SYMPTOMS
SINUS PAIN: 0
WHEEZING: 0
COUGH: 0
FEVER: 0
BRUISES/BLEEDS EASILY: 0
HEADACHES: 0
DIZZINESS: 0
UNEXPECTED WEIGHT CHANGE: 0
ARTHRALGIAS: 0
DIFFICULTY URINATING: 0
BLOOD IN STOOL: 0
SORE THROAT: 0
DIARRHEA: 0
PALPITATIONS: 0
ABDOMINAL PAIN: 0
FATIGUE: 0

## 2024-04-09 NOTE — PROGRESS NOTES
"Subjective   Patient ID: Dasha Carvajal is a 68 y.o. female who presents for Hypothyroidism and Results.      - Blood work reviewed with patient  - Hypothyroid improving continue levothyroxine 25 mcg daily counseled about days and side effect reevaluate patient in 3 months  - Need to proceed with mammogram, bone density as scheduled  - Patient did not schedule colonoscopy as recommended patient counseled about the need for screening colonoscopy new order placed today  -Nicotine dependency  Counseled about smoking cessation \"I spent more 3 minutes counseling patient about need for smoking/tobacco cessation and how I can support efforts when patient is ready to quit.  Discussed nicotine replacement therapy, Varenicline, Bupropion, hypnosis, support groups, and accupunture as potential options.  Patient currently has no signs or symptoms of tobacco related disease.\"  -Screening for lung cancer obtained September 2023 need to repeat in September 2024 follow-up lung nodule  - Hypercholesterolemia,.- Statin intolerance, patient now doing very well takes Repatha no complaints lipid profile improving continue with low-fat diet  -Hypertension now improved to continue with losartan hydrochlorothiazide no muscle cramping doing well  Follow-up 3 months                   Review of Systems   Constitutional:  Negative for fatigue, fever and unexpected weight change.   HENT:  Negative for congestion, ear discharge, ear pain, mouth sores, sinus pain and sore throat.    Eyes:  Negative for visual disturbance.   Respiratory:  Negative for cough and wheezing.    Cardiovascular:  Negative for chest pain, palpitations and leg swelling.   Gastrointestinal:  Negative for abdominal pain, blood in stool and diarrhea.   Genitourinary:  Negative for difficulty urinating.   Musculoskeletal:  Negative for arthralgias.   Skin:  Negative for rash.   Neurological:  Negative for dizziness and headaches.   Hematological:  Does not bruise/bleed " "easily.   Psychiatric/Behavioral:  Negative for behavioral problems.    All other systems reviewed and are negative.      Objective   No results found for: \"HGBA1C\"   /82   Pulse 73   Temp 36 °C (96.8 °F)   Wt 59.6 kg (131 lb 6.4 oz)   SpO2 95%   BMI 22.55 kg/m²     Physical Exam  Vitals and nursing note reviewed.   Constitutional:       Appearance: Normal appearance.   HENT:      Head: Normocephalic.      Nose: Nose normal.   Eyes:      Conjunctiva/sclera: Conjunctivae normal.      Pupils: Pupils are equal, round, and reactive to light.   Cardiovascular:      Rate and Rhythm: Regular rhythm.   Pulmonary:      Effort: Pulmonary effort is normal.      Breath sounds: Normal breath sounds.   Abdominal:      General: Abdomen is flat.      Palpations: Abdomen is soft.   Musculoskeletal:      Cervical back: Neck supple.   Skin:     General: Skin is warm.   Neurological:      General: No focal deficit present.      Mental Status: She is oriented to person, place, and time.   Psychiatric:         Mood and Affect: Mood normal.         Assessment/Plan   Dasha was seen today for hypothyroidism and results.  Diagnoses and all orders for this visit:  Hypothyroidism, unspecified type (Primary)  -     levothyroxine (Synthroid, Levoxyl) 25 mcg tablet; Take 1 tablet (25 mcg) by mouth once daily.  Alcoholic hepatitis without ascites  Screening for colon cancer  -     Colonoscopy Screening; Average Risk Patient; Future  Coronary atherosclerosis due to lipid rich plaque  Cigarette nicotine dependence with nicotine-induced disorder  Hypercholesteremia  Hypertension, unspecified type  Other orders  -     Follow Up In Primary Care - Established  -     Follow Up In Primary Care - Established; Future   - Blood work reviewed with patient  - Hypothyroid improving continue levothyroxine 25 mcg daily counseled about days and side effect reevaluate patient in 3 months  - Need to proceed with mammogram, bone density as scheduled  - " "Patient did not schedule colonoscopy as recommended patient counseled about the need for screening colonoscopy new order placed today  -Nicotine dependency  Counseled about smoking cessation \"I spent more 3 minutes counseling patient about need for smoking/tobacco cessation and how I can support efforts when patient is ready to quit.  Discussed nicotine replacement therapy, Varenicline, Bupropion, hypnosis, support groups, and accupunture as potential options.  Patient currently has no signs or symptoms of tobacco related disease.\"  -Screening for lung cancer obtained September 2023 need to repeat in September 2024 follow-up lung nodule  - Hypercholesterolemia,.- Statin intolerance, patient now doing very well takes Repatha no complaints lipid profile improving continue with low-fat diet  -Hypertension now improved to continue with losartan hydrochlorothiazide no muscle cramping doing well  Follow-up 3 months          "

## 2024-04-15 ENCOUNTER — HOSPITAL ENCOUNTER (OUTPATIENT)
Dept: RADIOLOGY | Facility: HOSPITAL | Age: 69
Discharge: HOME | End: 2024-04-15
Payer: MEDICARE

## 2024-04-15 VITALS — WEIGHT: 132 LBS | BODY MASS INDEX: 22.53 KG/M2 | HEIGHT: 64 IN

## 2024-04-15 DIAGNOSIS — Z78.0 MENOPAUSE: ICD-10-CM

## 2024-04-15 DIAGNOSIS — Z12.31 ENCOUNTER FOR SCREENING MAMMOGRAM FOR MALIGNANT NEOPLASM OF BREAST: ICD-10-CM

## 2024-04-15 PROCEDURE — 77067 SCR MAMMO BI INCL CAD: CPT

## 2024-04-15 PROCEDURE — 77080 DXA BONE DENSITY AXIAL: CPT

## 2024-04-15 PROCEDURE — 77067 SCR MAMMO BI INCL CAD: CPT | Mod: BILATERAL PROCEDURE | Performed by: RADIOLOGY

## 2024-04-15 PROCEDURE — 77063 BREAST TOMOSYNTHESIS BI: CPT | Mod: BILATERAL PROCEDURE | Performed by: RADIOLOGY

## 2024-04-16 ENCOUNTER — SPECIALTY PHARMACY (OUTPATIENT)
Dept: PHARMACY | Facility: CLINIC | Age: 69
End: 2024-04-16

## 2024-04-16 PROCEDURE — RXMED WILLOW AMBULATORY MEDICATION CHARGE

## 2024-04-24 ENCOUNTER — PHARMACY VISIT (OUTPATIENT)
Dept: PHARMACY | Facility: CLINIC | Age: 69
End: 2024-04-24
Payer: MEDICARE

## 2024-05-17 ENCOUNTER — SPECIALTY PHARMACY (OUTPATIENT)
Dept: PHARMACY | Facility: CLINIC | Age: 69
End: 2024-05-17

## 2024-05-17 PROCEDURE — RXMED WILLOW AMBULATORY MEDICATION CHARGE

## 2024-05-20 DIAGNOSIS — E03.9 HYPOTHYROIDISM, UNSPECIFIED TYPE: ICD-10-CM

## 2024-05-20 RX ORDER — LEVOTHYROXINE SODIUM 25 UG/1
25 TABLET ORAL DAILY
Qty: 90 TABLET | Refills: 0 | Status: SHIPPED | OUTPATIENT
Start: 2024-05-20

## 2024-05-22 ENCOUNTER — PHARMACY VISIT (OUTPATIENT)
Dept: PHARMACY | Facility: CLINIC | Age: 69
End: 2024-05-22
Payer: MEDICARE

## 2024-06-18 ENCOUNTER — SPECIALTY PHARMACY (OUTPATIENT)
Dept: PHARMACY | Facility: CLINIC | Age: 69
End: 2024-06-18

## 2024-06-18 PROCEDURE — RXMED WILLOW AMBULATORY MEDICATION CHARGE

## 2024-06-21 ENCOUNTER — PHARMACY VISIT (OUTPATIENT)
Dept: PHARMACY | Facility: CLINIC | Age: 69
End: 2024-06-21
Payer: MEDICARE

## 2024-07-09 ENCOUNTER — APPOINTMENT (OUTPATIENT)
Dept: PRIMARY CARE | Facility: CLINIC | Age: 69
End: 2024-07-09
Payer: MEDICARE

## 2024-07-09 VITALS
TEMPERATURE: 96.7 F | SYSTOLIC BLOOD PRESSURE: 124 MMHG | BODY MASS INDEX: 23 KG/M2 | DIASTOLIC BLOOD PRESSURE: 76 MMHG | OXYGEN SATURATION: 97 % | HEART RATE: 72 BPM | WEIGHT: 134 LBS

## 2024-07-09 DIAGNOSIS — Z78.9 STATIN INTOLERANCE: ICD-10-CM

## 2024-07-09 DIAGNOSIS — E78.00 HYPERCHOLESTEREMIA: ICD-10-CM

## 2024-07-09 DIAGNOSIS — I25.10 CORONARY ATHEROSCLEROSIS DUE TO LIPID RICH PLAQUE: ICD-10-CM

## 2024-07-09 DIAGNOSIS — I25.83 CORONARY ATHEROSCLEROSIS DUE TO LIPID RICH PLAQUE: ICD-10-CM

## 2024-07-09 DIAGNOSIS — M81.0 AGE-RELATED OSTEOPOROSIS WITHOUT CURRENT PATHOLOGICAL FRACTURE: Primary | ICD-10-CM

## 2024-07-09 DIAGNOSIS — E03.9 HYPOTHYROIDISM, UNSPECIFIED TYPE: ICD-10-CM

## 2024-07-09 PROCEDURE — 1160F RVW MEDS BY RX/DR IN RCRD: CPT | Performed by: INTERNAL MEDICINE

## 2024-07-09 PROCEDURE — 3078F DIAST BP <80 MM HG: CPT | Performed by: INTERNAL MEDICINE

## 2024-07-09 PROCEDURE — 99214 OFFICE O/P EST MOD 30 MIN: CPT | Performed by: INTERNAL MEDICINE

## 2024-07-09 PROCEDURE — 1159F MED LIST DOCD IN RCRD: CPT | Performed by: INTERNAL MEDICINE

## 2024-07-09 PROCEDURE — 3074F SYST BP LT 130 MM HG: CPT | Performed by: INTERNAL MEDICINE

## 2024-07-09 RX ORDER — FAMOTIDINE 10 MG/ML
20 INJECTION INTRAVENOUS ONCE AS NEEDED
OUTPATIENT
Start: 2024-07-09

## 2024-07-09 RX ORDER — DIPHENHYDRAMINE HYDROCHLORIDE 50 MG/ML
50 INJECTION INTRAMUSCULAR; INTRAVENOUS AS NEEDED
OUTPATIENT
Start: 2024-07-09

## 2024-07-09 RX ORDER — ALBUTEROL SULFATE 0.83 MG/ML
3 SOLUTION RESPIRATORY (INHALATION) AS NEEDED
OUTPATIENT
Start: 2024-07-09

## 2024-07-09 RX ORDER — EPINEPHRINE 0.3 MG/.3ML
0.3 INJECTION SUBCUTANEOUS EVERY 5 MIN PRN
OUTPATIENT
Start: 2024-07-09

## 2024-07-09 ASSESSMENT — ENCOUNTER SYMPTOMS
ABDOMINAL PAIN: 0
SINUS PAIN: 0
HEADACHES: 0
HYPERTENSION: 1
FATIGUE: 0
FEVER: 0
WHEEZING: 0
PALPITATIONS: 0
ARTHRALGIAS: 0
BLOOD IN STOOL: 0
DIFFICULTY URINATING: 0
DIARRHEA: 0
SORE THROAT: 0
DIZZINESS: 0
COUGH: 0
BRUISES/BLEEDS EASILY: 0
UNEXPECTED WEIGHT CHANGE: 0

## 2024-07-09 NOTE — PROGRESS NOTES
"Subjective   Patient ID: Dasha Carvajal is a 68 y.o. female who presents for Hypothyroidism, Hypertension, and Results (Bone density).    - Osteoporosis patient high risk for fracture, but calcium vitamin D exercise weightbearing exercises  Start patient on Prolia every 6 months underlying history of gastric ulcer high risk for dysphagia and GI symptoms not candidate for biphosphonate orally  - Hypothyroid improving continue levothyroxine 25 mcg daily counseled about days and side effect reevaluate patient in 3 months  - Need to proceed with mammogram, bone density as scheduled  - Patient did not schedule colonoscopy as recommended patient counseled about the need for screening colonoscopy new order placed today  -Nicotine dependency  Counseled about smoking cessation \"I spent more 3 minutes counseling patient about need for smoking/tobacco cessation and how I can support efforts when patient is ready to quit.  Discussed nicotine replacement therapy, Varenicline, Bupropion, hypnosis, support groups, and accupunture as potential options.  Patient currently has no signs or symptoms of tobacco related disease.\"  -Screening for lung cancer obtained September 2023 need to repeat in September 2024 follow-up lung nodule  - Hypercholesterolemia,.- Statin intolerance, patient now doing very well takes Repatha no complaints lipid profile improving continue with low-fat diet  -Hypertension now improved to continue with losartan hydrochlorothiazide no muscle cramping doing well  Follow-up 3 months         Hypertension  Pertinent negatives include no chest pain, headaches or palpitations.          Review of Systems   Constitutional:  Negative for fatigue, fever and unexpected weight change.   HENT:  Negative for congestion, ear discharge, ear pain, mouth sores, sinus pain and sore throat.    Eyes:  Negative for visual disturbance.   Respiratory:  Negative for cough and wheezing.    Cardiovascular:  Negative for chest pain, " "palpitations and leg swelling.   Gastrointestinal:  Negative for abdominal pain, blood in stool and diarrhea.   Genitourinary:  Negative for difficulty urinating.   Musculoskeletal:  Negative for arthralgias.   Skin:  Negative for rash.   Neurological:  Negative for dizziness and headaches.   Hematological:  Does not bruise/bleed easily.   Psychiatric/Behavioral:  Negative for behavioral problems.    All other systems reviewed and are negative.      Objective   No results found for: \"HGBA1C\"   /76   Pulse 72   Temp 35.9 °C (96.7 °F)   Wt 60.8 kg (134 lb)   SpO2 97%   BMI 23.00 kg/m²     Physical Exam  Vitals and nursing note reviewed.   Constitutional:       Appearance: Normal appearance.   HENT:      Head: Normocephalic.      Nose: Nose normal.   Eyes:      Conjunctiva/sclera: Conjunctivae normal.      Pupils: Pupils are equal, round, and reactive to light.   Cardiovascular:      Rate and Rhythm: Regular rhythm.   Pulmonary:      Effort: Pulmonary effort is normal.      Breath sounds: Normal breath sounds.   Abdominal:      General: Abdomen is flat.      Palpations: Abdomen is soft.   Musculoskeletal:      Cervical back: Neck supple.   Skin:     General: Skin is warm.   Neurological:      General: No focal deficit present.      Mental Status: She is oriented to person, place, and time.   Psychiatric:         Mood and Affect: Mood normal.         Assessment/Plan   Dasha was seen today for hypothyroidism, hypertension and results.  Diagnoses and all orders for this visit:  Age-related osteoporosis without current pathological fracture (Primary)  Hypothyroidism, unspecified type  Hypercholesteremia  Coronary atherosclerosis due to lipid rich plaque  Statin intolerance  Other orders  -     denosumab (Prolia) injection 60 mg  -     sodium chloride 0.9 % bolus 500 mL  -     dextrose 5 % in water (D5W) bolus  -     diphenhydrAMINE (BENADryl) injection 50 mg  -     methylPREDNISolone sod succinate " "(SOLU-Medrol) 40 mg/mL injection 40 mg  -     famotidine PF (Pepcid) injection 20 mg  -     EPINEPHrine (Epipen) injection syringe 0.3 mg  -     albuterol 2.5 mg /3 mL (0.083 %) nebulizer solution 3 mL   - Osteoporosis patient high risk for fracture, but calcium vitamin D exercise weightbearing exercises  Start patient on Prolia every 6 months underlying history of gastric ulcer high risk for dysphagia and GI symptoms not candidate for biphosphonate orally  - Hypothyroid improving continue levothyroxine 25 mcg daily counseled about days and side effect reevaluate patient in 3 months  - Need to proceed with mammogram, bone density as scheduled  - Patient did not schedule colonoscopy as recommended patient counseled about the need for screening colonoscopy new order placed today  -Nicotine dependency  Counseled about smoking cessation \"I spent more 3 minutes counseling patient about need for smoking/tobacco cessation and how I can support efforts when patient is ready to quit.  Discussed nicotine replacement therapy, Varenicline, Bupropion, hypnosis, support groups, and accupunture as potential options.  Patient currently has no signs or symptoms of tobacco related disease.\"  -Screening for lung cancer obtained September 2023 need to repeat in September 2024 follow-up lung nodule  - Hypercholesterolemia,.- Statin intolerance, patient now doing very well takes Repatha no complaints lipid profile improving continue with low-fat diet  -Hypertension now improved to continue with losartan hydrochlorothiazide no muscle cramping doing well  Follow-up 3 months       "

## 2024-07-10 ENCOUNTER — TELEPHONE (OUTPATIENT)
Dept: HEMATOLOGY/ONCOLOGY | Facility: HOSPITAL | Age: 69
End: 2024-07-10
Payer: MEDICARE

## 2024-07-10 NOTE — TELEPHONE ENCOUNTER
Received Prolia order, ordered by Renee Briscoe MD. Reached out to patient to schedule. Patient confirmed this would be her first dose. Patient aware of labs needed prior to appointment. Patient has Medicare Part A and B as primary with [place] as secondary. Pre-cert NPN. Patient requested to schedule Prolia in October. Per patient, she goes to [place] during the winter and to keep the Prolia on every 6 months, she wants it scheduled in October before she leaves and in April when she returns. Scheduled for first dose on 10/1/24. Informed patient I will send her a message via [employer] to remind her to get labs drawn prior to appointment. Patient verbalized understanding and agreed to plan of care/appointments.

## 2024-07-17 ENCOUNTER — SPECIALTY PHARMACY (OUTPATIENT)
Dept: PHARMACY | Facility: CLINIC | Age: 69
End: 2024-07-17

## 2024-07-24 ENCOUNTER — SPECIALTY PHARMACY (OUTPATIENT)
Dept: PHARMACY | Facility: CLINIC | Age: 69
End: 2024-07-24

## 2024-07-24 PROCEDURE — RXMED WILLOW AMBULATORY MEDICATION CHARGE

## 2024-07-30 ENCOUNTER — PHARMACY VISIT (OUTPATIENT)
Dept: PHARMACY | Facility: CLINIC | Age: 69
End: 2024-07-30
Payer: MEDICARE

## 2024-08-20 ENCOUNTER — SPECIALTY PHARMACY (OUTPATIENT)
Dept: PHARMACY | Facility: CLINIC | Age: 69
End: 2024-08-20

## 2024-08-20 PROCEDURE — RXMED WILLOW AMBULATORY MEDICATION CHARGE

## 2024-08-29 ENCOUNTER — PHARMACY VISIT (OUTPATIENT)
Dept: PHARMACY | Facility: CLINIC | Age: 69
End: 2024-08-29
Payer: MEDICARE

## 2024-08-29 ENCOUNTER — SPECIALTY PHARMACY (OUTPATIENT)
Dept: PHARMACY | Facility: CLINIC | Age: 69
End: 2024-08-29

## 2024-09-09 DIAGNOSIS — F17.219 CIGARETTE NICOTINE DEPENDENCE WITH NICOTINE-INDUCED DISORDER: ICD-10-CM

## 2024-09-09 DIAGNOSIS — R91.8 LUNG NODULES: Primary | ICD-10-CM

## 2024-09-19 ENCOUNTER — SPECIALTY PHARMACY (OUTPATIENT)
Dept: PHARMACY | Facility: CLINIC | Age: 69
End: 2024-09-19

## 2024-09-19 PROCEDURE — RXMED WILLOW AMBULATORY MEDICATION CHARGE

## 2024-09-25 ENCOUNTER — PHARMACY VISIT (OUTPATIENT)
Dept: PHARMACY | Facility: CLINIC | Age: 69
End: 2024-09-25
Payer: MEDICARE

## 2024-09-27 ENCOUNTER — HOSPITAL ENCOUNTER (OUTPATIENT)
Dept: RADIOLOGY | Facility: HOSPITAL | Age: 69
Discharge: HOME | End: 2024-09-27
Payer: MEDICARE

## 2024-09-27 ENCOUNTER — LAB (OUTPATIENT)
Dept: LAB | Facility: LAB | Age: 69
End: 2024-09-27
Payer: MEDICARE

## 2024-09-27 DIAGNOSIS — R91.8 LUNG NODULES: ICD-10-CM

## 2024-09-27 DIAGNOSIS — M81.0 AGE-RELATED OSTEOPOROSIS WITHOUT CURRENT PATHOLOGICAL FRACTURE: ICD-10-CM

## 2024-09-27 DIAGNOSIS — F17.219 CIGARETTE NICOTINE DEPENDENCE WITH NICOTINE-INDUCED DISORDER: ICD-10-CM

## 2024-09-27 LAB
ALBUMIN SERPL BCP-MCNC: 4.4 G/DL (ref 3.4–5)
ALP SERPL-CCNC: 80 U/L (ref 33–136)
ALT SERPL W P-5'-P-CCNC: 30 U/L (ref 7–45)
ANION GAP SERPL CALC-SCNC: 12 MMOL/L (ref 10–20)
AST SERPL W P-5'-P-CCNC: 34 U/L (ref 9–39)
BILIRUB SERPL-MCNC: 0.5 MG/DL (ref 0–1.2)
BUN SERPL-MCNC: 18 MG/DL (ref 6–23)
CALCIUM SERPL-MCNC: 9.5 MG/DL (ref 8.6–10.3)
CHLORIDE SERPL-SCNC: 104 MMOL/L (ref 98–107)
CO2 SERPL-SCNC: 26 MMOL/L (ref 21–32)
CREAT SERPL-MCNC: 0.81 MG/DL (ref 0.5–1.05)
EGFRCR SERPLBLD CKD-EPI 2021: 78 ML/MIN/1.73M*2
GLUCOSE SERPL-MCNC: 107 MG/DL (ref 74–99)
POTASSIUM SERPL-SCNC: 4.8 MMOL/L (ref 3.5–5.3)
PROT SERPL-MCNC: 7 G/DL (ref 6.4–8.2)
SODIUM SERPL-SCNC: 137 MMOL/L (ref 136–145)

## 2024-09-27 PROCEDURE — 36415 COLL VENOUS BLD VENIPUNCTURE: CPT

## 2024-09-27 PROCEDURE — 80053 COMPREHEN METABOLIC PANEL: CPT

## 2024-09-27 PROCEDURE — 71271 CT THORAX LUNG CANCER SCR C-: CPT

## 2024-10-01 ENCOUNTER — INFUSION (OUTPATIENT)
Dept: HEMATOLOGY/ONCOLOGY | Facility: HOSPITAL | Age: 69
End: 2024-10-01
Payer: MEDICARE

## 2024-10-01 DIAGNOSIS — M81.0 AGE-RELATED OSTEOPOROSIS WITHOUT CURRENT PATHOLOGICAL FRACTURE: ICD-10-CM

## 2024-10-01 PROCEDURE — 2500000004 HC RX 250 GENERAL PHARMACY W/ HCPCS (ALT 636 FOR OP/ED): Mod: JZ,TB | Performed by: INTERNAL MEDICINE

## 2024-10-01 PROCEDURE — 96372 THER/PROPH/DIAG INJ SC/IM: CPT

## 2024-10-01 RX ORDER — DIPHENHYDRAMINE HYDROCHLORIDE 50 MG/ML
50 INJECTION INTRAMUSCULAR; INTRAVENOUS AS NEEDED
OUTPATIENT
Start: 2025-03-26

## 2024-10-01 RX ORDER — ALBUTEROL SULFATE 0.83 MG/ML
3 SOLUTION RESPIRATORY (INHALATION) AS NEEDED
OUTPATIENT
Start: 2025-03-26

## 2024-10-01 RX ORDER — FAMOTIDINE 10 MG/ML
20 INJECTION INTRAVENOUS ONCE AS NEEDED
OUTPATIENT
Start: 2025-03-26

## 2024-10-01 RX ORDER — EPINEPHRINE 0.3 MG/.3ML
0.3 INJECTION SUBCUTANEOUS EVERY 5 MIN PRN
OUTPATIENT
Start: 2025-03-26

## 2024-10-01 ASSESSMENT — LIFESTYLE VARIABLES
HOW OFTEN DO YOU HAVE SIX OR MORE DRINKS ON ONE OCCASION: NEVER
HOW MANY STANDARD DRINKS CONTAINING ALCOHOL DO YOU HAVE ON A TYPICAL DAY: PATIENT DOES NOT DRINK
SKIP TO QUESTIONS 9-10: 1
HOW OFTEN DO YOU HAVE A DRINK CONTAINING ALCOHOL: NEVER
AUDIT-C TOTAL SCORE: 0

## 2024-10-01 ASSESSMENT — PATIENT HEALTH QUESTIONNAIRE - PHQ9
SUM OF ALL RESPONSES TO PHQ9 QUESTIONS 1 & 2: 0
2. FEELING DOWN, DEPRESSED OR HOPELESS: NOT AT ALL
1. LITTLE INTEREST OR PLEASURE IN DOING THINGS: NOT AT ALL

## 2024-10-01 ASSESSMENT — COLUMBIA-SUICIDE SEVERITY RATING SCALE - C-SSRS
1. IN THE PAST MONTH, HAVE YOU WISHED YOU WERE DEAD OR WISHED YOU COULD GO TO SLEEP AND NOT WAKE UP?: NO
2. HAVE YOU ACTUALLY HAD ANY THOUGHTS OF KILLING YOURSELF?: NO
6. HAVE YOU EVER DONE ANYTHING, STARTED TO DO ANYTHING, OR PREPARED TO DO ANYTHING TO END YOUR LIFE?: NO

## 2024-10-01 ASSESSMENT — ENCOUNTER SYMPTOMS
DEPRESSION: 0
LOSS OF SENSATION IN FEET: 0
OCCASIONAL FEELINGS OF UNSTEADINESS: 0

## 2024-10-09 ENCOUNTER — APPOINTMENT (OUTPATIENT)
Dept: PRIMARY CARE | Facility: CLINIC | Age: 69
End: 2024-10-09
Payer: MEDICARE

## 2024-10-09 VITALS
DIASTOLIC BLOOD PRESSURE: 74 MMHG | TEMPERATURE: 96.3 F | OXYGEN SATURATION: 96 % | SYSTOLIC BLOOD PRESSURE: 132 MMHG | HEART RATE: 72 BPM | BODY MASS INDEX: 23.24 KG/M2 | WEIGHT: 135.4 LBS

## 2024-10-09 DIAGNOSIS — F17.219 CIGARETTE NICOTINE DEPENDENCE WITH NICOTINE-INDUCED DISORDER: ICD-10-CM

## 2024-10-09 DIAGNOSIS — K70.10 ALCOHOLIC HEPATITIS WITHOUT ASCITES (MULTI): ICD-10-CM

## 2024-10-09 DIAGNOSIS — E55.9 VITAMIN D DEFICIENCY, UNSPECIFIED: Primary | ICD-10-CM

## 2024-10-09 DIAGNOSIS — R93.1 HIGH CORONARY ARTERY CALCIUM SCORE: ICD-10-CM

## 2024-10-09 DIAGNOSIS — E78.00 HYPERCHOLESTEREMIA: ICD-10-CM

## 2024-10-09 DIAGNOSIS — R91.8 LUNG NODULES: ICD-10-CM

## 2024-10-09 DIAGNOSIS — I10 HYPERTENSION, UNSPECIFIED TYPE: ICD-10-CM

## 2024-10-09 DIAGNOSIS — E03.9 HYPOTHYROIDISM, UNSPECIFIED TYPE: ICD-10-CM

## 2024-10-09 DIAGNOSIS — I25.83 CORONARY ATHEROSCLEROSIS DUE TO LIPID RICH PLAQUE: ICD-10-CM

## 2024-10-09 DIAGNOSIS — Z78.9 STATIN INTOLERANCE: ICD-10-CM

## 2024-10-09 PROCEDURE — 1159F MED LIST DOCD IN RCRD: CPT | Performed by: INTERNAL MEDICINE

## 2024-10-09 PROCEDURE — 99214 OFFICE O/P EST MOD 30 MIN: CPT | Performed by: INTERNAL MEDICINE

## 2024-10-09 PROCEDURE — 1123F ACP DISCUSS/DSCN MKR DOCD: CPT | Performed by: INTERNAL MEDICINE

## 2024-10-09 PROCEDURE — 99406 BEHAV CHNG SMOKING 3-10 MIN: CPT | Performed by: INTERNAL MEDICINE

## 2024-10-09 PROCEDURE — 1158F ADVNC CARE PLAN TLK DOCD: CPT | Performed by: INTERNAL MEDICINE

## 2024-10-09 PROCEDURE — 3075F SYST BP GE 130 - 139MM HG: CPT | Performed by: INTERNAL MEDICINE

## 2024-10-09 PROCEDURE — 1160F RVW MEDS BY RX/DR IN RCRD: CPT | Performed by: INTERNAL MEDICINE

## 2024-10-09 PROCEDURE — 3078F DIAST BP <80 MM HG: CPT | Performed by: INTERNAL MEDICINE

## 2024-10-09 RX ORDER — CHOLECALCIFEROL (VITAMIN D3) 125 MCG
125 CAPSULE ORAL DAILY
Qty: 30 CAPSULE | Refills: 11 | Status: SHIPPED | OUTPATIENT
Start: 2024-10-09 | End: 2025-10-09

## 2024-10-09 RX ORDER — SODIUM FLUORIDE 6 MG/ML
PASTE, DENTIFRICE DENTAL
COMMUNITY
Start: 2024-02-17

## 2024-10-09 ASSESSMENT — ENCOUNTER SYMPTOMS
PALPITATIONS: 0
FATIGUE: 0
ARTHRALGIAS: 0
BLOOD IN STOOL: 0
BRUISES/BLEEDS EASILY: 0
SINUS PAIN: 0
COUGH: 0
UNEXPECTED WEIGHT CHANGE: 0
DIFFICULTY URINATING: 0
ABDOMINAL PAIN: 0
DIARRHEA: 0
SORE THROAT: 0
WHEEZING: 0
HEADACHES: 0
FEVER: 0
DIZZINESS: 0

## 2024-10-09 NOTE — PROGRESS NOTES
"Subjective   Patient ID: Dasha Carvajal is a 68 y.o. female who presents for Hypothyroidism, Osteoporosis, and Immunizations (Declined flu inj).    -Low-dose CT scanning months if no significant changes need to repeat low-dose CT scan in September 2025  -Incidental finding high cardiac calcium score more than 500  Patient previously had a stress test without significant disease cannot take statin because of statin intolerance now on Repatha injections  Follow-up lipid profile in 2 months continue low-fat diet  -Vitamin D deficiency need to start on vitamin D 5000 daily follow-up vitamin D and 2-month  -Osteoporosis/bone density April 2024 patient started on Prolia mild joint pains after the injections otherwise controlled and tolerated  - Mammogram negative done in April 202 4 repeat in April 2025  -- Hypothyroid improving continue levothyroxine 25 mcg daily counseled about days and side effect reevaluate patient in 3 months  - - Patient did not schedule colonoscopy as recommended patient counseled about the need for screening colonoscopy follow-up as recommended  -Nicotine dependency  Counseled about smoking cessation \"I spent more 3 minutes counseling patient about need for smoking/tobacco cessation and how I can support efforts when patient is ready to quit.  Discussed nicotine replacement therapy, Varenicline, Bupropion, hypnosis, support groups, and accupunture as potential options.  Patient currently has no signs or symptoms of tobacco related disease.\"  -Hypertension now improved to continue with losartan hydrochlorothiazide no muscle cramping doing well  Follow-up 3 months Medicare physical                   Review of Systems   Constitutional:  Negative for fatigue, fever and unexpected weight change.   HENT:  Negative for congestion, ear discharge, ear pain, mouth sores, sinus pain and sore throat.    Eyes:  Negative for visual disturbance.   Respiratory:  Negative for cough and wheezing.  " "  Cardiovascular:  Negative for chest pain, palpitations and leg swelling.   Gastrointestinal:  Negative for abdominal pain, blood in stool and diarrhea.   Genitourinary:  Negative for difficulty urinating.   Musculoskeletal:  Negative for arthralgias.   Skin:  Negative for rash.   Neurological:  Negative for dizziness and headaches.   Hematological:  Does not bruise/bleed easily.   Psychiatric/Behavioral:  Negative for behavioral problems.    All other systems reviewed and are negative.      Objective   No results found for: \"HGBA1C\"   /74   Pulse 72   Temp 35.7 °C (96.3 °F)   Wt 61.4 kg (135 lb 6.4 oz)   SpO2 96%   BMI 23.24 kg/m²   Lab Results   Component Value Date    WBC 8.6 12/04/2023    HGB 13.5 12/04/2023    HCT 41.2 12/04/2023     12/04/2023    CHOL 149 12/04/2023    TRIG 120 12/04/2023    HDL 90.1 12/04/2023    ALT 30 09/27/2024    AST 34 09/27/2024     09/27/2024    K 4.8 09/27/2024     09/27/2024    CREATININE 0.81 09/27/2024    BUN 18 09/27/2024    CO2 26 09/27/2024    TSH 2.13 03/27/2024     par   Physical Exam  Vitals and nursing note reviewed.   Constitutional:       Appearance: Normal appearance.   HENT:      Head: Normocephalic.      Nose: Nose normal.   Eyes:      Conjunctiva/sclera: Conjunctivae normal.      Pupils: Pupils are equal, round, and reactive to light.   Cardiovascular:      Rate and Rhythm: Regular rhythm.   Pulmonary:      Effort: Pulmonary effort is normal.      Breath sounds: Normal breath sounds.   Abdominal:      General: Abdomen is flat.      Palpations: Abdomen is soft.   Musculoskeletal:      Cervical back: Neck supple.   Skin:     General: Skin is warm.   Neurological:      General: No focal deficit present.      Mental Status: She is oriented to person, place, and time.   Psychiatric:         Mood and Affect: Mood normal.         Assessment/Plan   Dasha was seen today for hypothyroidism, osteoporosis and immunizations.  Diagnoses and all " "orders for this visit:  Vitamin D deficiency, unspecified (Primary)  -     Vitamin D 25-Hydroxy,Total (for eval of Vitamin D levels); Future  -     cholecalciferol (Vitamin D-3) 125 MCG (5000 UT) capsule; Take 1 capsule (125 mcg) by mouth once daily.  Hypercholesteremia  -     Lipid Panel; Future  Coronary atherosclerosis due to lipid rich plaque  High coronary artery calcium score  Hypertension, unspecified type  Cigarette nicotine dependence with nicotine-induced disorder  Alcoholic hepatitis without ascites (Multi)  Statin intolerance  Lung nodules  Hypothyroidism, unspecified type  -     TSH with reflex to Free T4 if abnormal; Future  Other orders  -     Follow Up In Primary Care - Medicare Annual; Future   -Low-dose CT scanning months if no significant changes need to repeat low-dose CT scan in September 2025  -Incidental finding high cardiac calcium score more than 500  Patient previously had a stress test without significant disease cannot take statin because of statin intolerance now on Repatha injections  Follow-up lipid profile in 2 months continue low-fat diet  -Vitamin D deficiency need to start on vitamin D 5000 daily follow-up vitamin D and 2-month  -Osteoporosis/bone density April 2024 patient started on Prolia mild joint pains after the injections otherwise controlled and tolerated  - Mammogram negative done in April 202 4 repeat in April 2025  -- Hypothyroid improving continue levothyroxine 25 mcg daily counseled about days and side effect reevaluate patient in 3 months  - - Patient did not schedule colonoscopy as recommended patient counseled about the need for screening colonoscopy follow-up as recommended  -Nicotine dependency  Counseled about smoking cessation \"I spent more 3 minutes counseling patient about need for smoking/tobacco cessation and how I can support efforts when patient is ready to quit.  Discussed nicotine replacement therapy, Varenicline, Bupropion, hypnosis, support groups, and " "accupunture as potential options.  Patient currently has no signs or symptoms of tobacco related disease.\"  -Hypertension now improved to continue with losartan hydrochlorothiazide no muscle cramping doing well  Follow-up 3 months Medicare physical          "

## 2024-10-14 DIAGNOSIS — Z78.9 STATIN INTOLERANCE: ICD-10-CM

## 2024-10-14 PROCEDURE — RXMED WILLOW AMBULATORY MEDICATION CHARGE

## 2024-10-14 RX ORDER — EVOLOCUMAB 140 MG/ML
INJECTION, SOLUTION SUBCUTANEOUS
Qty: 2 ML | Refills: 11 | Status: SHIPPED | OUTPATIENT
Start: 2024-10-14 | End: 2025-10-14

## 2024-10-28 ENCOUNTER — PHARMACY VISIT (OUTPATIENT)
Dept: PHARMACY | Facility: CLINIC | Age: 69
End: 2024-10-28
Payer: MEDICARE

## 2024-11-19 ENCOUNTER — SPECIALTY PHARMACY (OUTPATIENT)
Dept: PHARMACY | Facility: CLINIC | Age: 69
End: 2024-11-19

## 2024-11-19 PROCEDURE — RXMED WILLOW AMBULATORY MEDICATION CHARGE

## 2024-11-29 ENCOUNTER — PHARMACY VISIT (OUTPATIENT)
Dept: PHARMACY | Facility: CLINIC | Age: 69
End: 2024-11-29
Payer: MEDICARE

## 2024-12-04 ENCOUNTER — LAB (OUTPATIENT)
Dept: LAB | Facility: LAB | Age: 69
End: 2024-12-04
Payer: MEDICARE

## 2024-12-04 DIAGNOSIS — E78.00 HYPERCHOLESTEREMIA: ICD-10-CM

## 2024-12-04 DIAGNOSIS — E55.9 VITAMIN D DEFICIENCY, UNSPECIFIED: ICD-10-CM

## 2024-12-04 DIAGNOSIS — E03.9 HYPOTHYROIDISM, UNSPECIFIED TYPE: ICD-10-CM

## 2024-12-04 LAB
25(OH)D3 SERPL-MCNC: 49 NG/ML (ref 30–100)
CHOLEST SERPL-MCNC: 158 MG/DL (ref 0–199)
CHOLESTEROL/HDL RATIO: 1.5
HDLC SERPL-MCNC: 102.4 MG/DL
LDLC SERPL CALC-MCNC: 40 MG/DL
NON HDL CHOLESTEROL: 56 MG/DL (ref 0–149)
TRIGL SERPL-MCNC: 79 MG/DL (ref 0–149)
TSH SERPL-ACNC: 3.6 MIU/L (ref 0.44–3.98)
VLDL: 16 MG/DL (ref 0–40)

## 2024-12-04 PROCEDURE — 36415 COLL VENOUS BLD VENIPUNCTURE: CPT

## 2024-12-04 PROCEDURE — 82306 VITAMIN D 25 HYDROXY: CPT

## 2024-12-05 ENCOUNTER — APPOINTMENT (OUTPATIENT)
Dept: PRIMARY CARE | Facility: CLINIC | Age: 69
End: 2024-12-05
Payer: MEDICARE

## 2024-12-05 DIAGNOSIS — E78.00 HYPERCHOLESTEREMIA: ICD-10-CM

## 2024-12-05 DIAGNOSIS — E03.9 HYPOTHYROIDISM, UNSPECIFIED TYPE: ICD-10-CM

## 2024-12-05 DIAGNOSIS — I10 HYPERTENSION, UNSPECIFIED TYPE: ICD-10-CM

## 2024-12-05 DIAGNOSIS — R91.8 LUNG NODULES: ICD-10-CM

## 2024-12-05 DIAGNOSIS — F17.219 NICOTINE DEPENDENCE, CIGARETTES, W UNSP DISORDERS: ICD-10-CM

## 2024-12-05 DIAGNOSIS — Z12.11 SCREENING FOR COLON CANCER: ICD-10-CM

## 2024-12-05 DIAGNOSIS — R93.1 HIGH CORONARY ARTERY CALCIUM SCORE: ICD-10-CM

## 2024-12-05 DIAGNOSIS — F17.219 CIGARETTE NICOTINE DEPENDENCE WITH NICOTINE-INDUCED DISORDER: ICD-10-CM

## 2024-12-05 DIAGNOSIS — Z00.00 ROUTINE GENERAL MEDICAL EXAMINATION AT HEALTH CARE FACILITY: ICD-10-CM

## 2024-12-05 DIAGNOSIS — Z12.2 ENCOUNTER FOR SCREENING FOR LUNG CANCER: ICD-10-CM

## 2024-12-05 DIAGNOSIS — Z78.9 STATIN INTOLERANCE: ICD-10-CM

## 2024-12-05 DIAGNOSIS — Z12.31 ENCOUNTER FOR SCREENING MAMMOGRAM FOR MALIGNANT NEOPLASM OF BREAST: Primary | ICD-10-CM

## 2024-12-05 DIAGNOSIS — I25.83 CORONARY ATHEROSCLEROSIS DUE TO LIPID RICH PLAQUE: ICD-10-CM

## 2024-12-05 PROCEDURE — 1160F RVW MEDS BY RX/DR IN RCRD: CPT | Performed by: INTERNAL MEDICINE

## 2024-12-05 PROCEDURE — 1159F MED LIST DOCD IN RCRD: CPT | Performed by: INTERNAL MEDICINE

## 2024-12-05 PROCEDURE — G0439 PPPS, SUBSEQ VISIT: HCPCS | Performed by: INTERNAL MEDICINE

## 2024-12-05 PROCEDURE — 1170F FXNL STATUS ASSESSED: CPT | Performed by: INTERNAL MEDICINE

## 2024-12-05 PROCEDURE — 99214 OFFICE O/P EST MOD 30 MIN: CPT | Performed by: INTERNAL MEDICINE

## 2024-12-05 PROCEDURE — 1124F ACP DISCUSS-NO DSCNMKR DOCD: CPT | Performed by: INTERNAL MEDICINE

## 2024-12-05 RX ORDER — LEVOTHYROXINE SODIUM 25 UG/1
25 TABLET ORAL DAILY
Qty: 90 TABLET | Refills: 1 | Status: SHIPPED | OUTPATIENT
Start: 2024-12-05

## 2024-12-05 RX ORDER — LATANOPROST 50 UG/ML
SOLUTION/ DROPS OPHTHALMIC
COMMUNITY
Start: 2024-11-20

## 2024-12-05 RX ORDER — LOSARTAN POTASSIUM AND HYDROCHLOROTHIAZIDE 12.5; 5 MG/1; MG/1
1 TABLET ORAL DAILY
Qty: 90 TABLET | Refills: 1 | Status: SHIPPED | OUTPATIENT
Start: 2024-12-05 | End: 2025-12-05

## 2024-12-05 ASSESSMENT — PATIENT HEALTH QUESTIONNAIRE - PHQ9
1. LITTLE INTEREST OR PLEASURE IN DOING THINGS: NOT AT ALL
SUM OF ALL RESPONSES TO PHQ9 QUESTIONS 1 AND 2: 0
2. FEELING DOWN, DEPRESSED OR HOPELESS: NOT AT ALL

## 2024-12-05 ASSESSMENT — ENCOUNTER SYMPTOMS
UNEXPECTED WEIGHT CHANGE: 0
HEADACHES: 0
SORE THROAT: 0
ARTHRALGIAS: 0
DIZZINESS: 0
COUGH: 0
WHEEZING: 0
FEVER: 0
BLOOD IN STOOL: 0
HYPERTENSION: 1
PALPITATIONS: 0
DIARRHEA: 0
FATIGUE: 0
SINUS PAIN: 0
DIFFICULTY URINATING: 0
BRUISES/BLEEDS EASILY: 0
ABDOMINAL PAIN: 0

## 2024-12-05 ASSESSMENT — ACTIVITIES OF DAILY LIVING (ADL)
MANAGING_FINANCES: INDEPENDENT
GROCERY_SHOPPING: INDEPENDENT
BATHING: INDEPENDENT
DOING_HOUSEWORK: INDEPENDENT
DRESSING: INDEPENDENT
TAKING_MEDICATION: INDEPENDENT

## 2024-12-05 NOTE — ASSESSMENT & PLAN NOTE
Orders:    losartan-hydrochlorothiazide (Hyzaar) 50-12.5 mg tablet; Take 1 tablet by mouth once daily.

## 2024-12-05 NOTE — PROGRESS NOTES
"Subjective   Patient ID: Dasha Carvajal is a 69 y.o. female who presents for Med Refill, Results, Hypertension, and Hyperlipidemia.    Annual preventive visit  -Vaccination patient declined vaccines aware of risk and benefit  -Screening mammogram obtained and up-to-date new order placed for April 2025  -Need to proceed with screening colonoscopy as recommended patient did not proceed as recommended, order placed in the EMR.  Osteoporosis left hip continue with vitamin D exercise  Repeat bone density April 2026  - Nicotine dependency  Counseled about smoking cessation \"I spent more 3 minutes counseling patient about need for smoking/tobacco cessation and how I can support efforts when patient is ready to quit.  Discussed nicotine replacement therapy, Varenicline, Bupropion, hypnosis, support groups, and accupunture as potential options.  Patient currently has no signs or symptoms of tobacco related disease.\"  -Screening for lung cancer obtained September 2023 need to repeat in September 2024 follow-up lung nodule stable need to repeat in September 2025    -I discussed smoking history, patient meets criteria for lung cancer screening with low-dose CT scan. By using shared decision making we determined that patient would benefit from that screening including a discussion of benefits and harms of screening, follow-up diagnostic testing, over diagnosis, false positive  rate and total radiation exposure  I counseled the patient about the  importance of adhering to annual lung cancer low-dose CT screening, the impact of comorbidities and his  ability or willingness to undergo diagnosis and treatment if abnormality discovered. I provided patient an order for low-dose CT lung cancer screening.    Follow-up    -Low-dose CT scanning months if no significant changes need to repeat low-dose CT scan in September 2025  -Hide coronary calcium score  Patient previously had a stress test without significant disease cannot take " "statin because of statin intolerance now on Repatha injections  Follow-up lipid profile improving  - Hypercholesteremia controlled continue Repatha as recommended continue low-fat diet  -Vitamin D deficiency continue vitamin D 5000 daily  -Osteoporosis/bone density April 2024 patient on Prolia mild joint pains after the injections otherwise controlled and tolerated  --- Hypothyroid improving continue levothyroxine 25 mcg follow-up 6 months  --Hypertension now improved to continue with losartan hydrochlorothiazide no muscle cramping doing well  Follow-up 6 months      Med Refill  Pertinent negatives include no abdominal pain, arthralgias, chest pain, congestion, coughing, fatigue, fever, headaches, rash or sore throat.   Hypertension  Pertinent negatives include no chest pain, headaches or palpitations.   Hyperlipidemia  Pertinent negatives include no chest pain.          Review of Systems   Constitutional:  Negative for fatigue, fever and unexpected weight change.   HENT:  Negative for congestion, ear discharge, ear pain, mouth sores, sinus pain and sore throat.    Eyes:  Negative for visual disturbance.   Respiratory:  Negative for cough and wheezing.    Cardiovascular:  Negative for chest pain, palpitations and leg swelling.   Gastrointestinal:  Negative for abdominal pain, blood in stool and diarrhea.   Genitourinary:  Negative for difficulty urinating.   Musculoskeletal:  Negative for arthralgias.   Skin:  Negative for rash.   Neurological:  Negative for dizziness and headaches.   Hematological:  Does not bruise/bleed easily.   Psychiatric/Behavioral:  Negative for behavioral problems.    All other systems reviewed and are negative.      Objective   No results found for: \"HGBA1C\"   There were no vitals taken for this visit.    Physical Exam  Constitutional:       General: She is not in acute distress.     Appearance: She is not ill-appearing, toxic-appearing or diaphoretic.   Neurological:      Mental Status: " "She is alert.         Assessment/Plan   Dasha was seen today for med refill, results, hypertension and hyperlipidemia.  Diagnoses and all orders for this visit:  Encounter for screening mammogram for malignant neoplasm of breast (Primary)  -     BI mammo bilateral screening tomosynthesis; Future  Hypothyroidism, unspecified type  -     levothyroxine (Synthroid, Levoxyl) 25 mcg tablet; Take 1 tablet (25 mcg) by mouth once daily.  Hypertension, unspecified type  -     losartan-hydrochlorothiazide (Hyzaar) 50-12.5 mg tablet; Take 1 tablet by mouth once daily.  Encounter for screening for lung cancer  -     CT lung screening low dose; Future  Nicotine dependence, cigarettes, w unsp disorders  -     CT lung screening low dose; Future  Coronary atherosclerosis due to lipid rich plaque  Hypercholesteremia  High coronary artery calcium score  Cigarette nicotine dependence with nicotine-induced disorder  Statin intolerance  Lung nodules  Other orders  -     Follow Up In Primary Care - Medicare Annual  -     Follow Up In Primary Care - Established; Future   Annual preventive visit  -Vaccination patient declined vaccines aware of risk and benefit  -Screening mammogram obtained and up-to-date new order placed for April 2025  -Need to proceed with screening colonoscopy as recommended patient did not proceed as recommended, order placed in the EMR.  Osteoporosis left hip continue with vitamin D exercise  Repeat bone density April 2026  - Nicotine dependency  Counseled about smoking cessation \"I spent more 3 minutes counseling patient about need for smoking/tobacco cessation and how I can support efforts when patient is ready to quit.  Discussed nicotine replacement therapy, Varenicline, Bupropion, hypnosis, support groups, and accupunture as potential options.  Patient currently has no signs or symptoms of tobacco related disease.\"  -Screening for lung cancer obtained September 2023 need to repeat in September 2024 follow-up " lung nodule stable need to repeat in September 2025    -I discussed smoking history, patient meets criteria for lung cancer screening with low-dose CT scan. By using shared decision making we determined that patient would benefit from that screening including a discussion of benefits and harms of screening, follow-up diagnostic testing, over diagnosis, false positive  rate and total radiation exposure  I counseled the patient about the  importance of adhering to annual lung cancer low-dose CT screening, the impact of comorbidities and his  ability or willingness to undergo diagnosis and treatment if abnormality discovered. I provided patient an order for low-dose CT lung cancer screening.    Follow-up    -Low-dose CT scanning months if no significant changes need to repeat low-dose CT scan in September 2025  -Hide coronary calcium score  Patient previously had a stress test without significant disease cannot take statin because of statin intolerance now on Repatha injections  Follow-up lipid profile improving  - Hypercholesteremia controlled continue Repatha as recommended continue low-fat diet  -Vitamin D deficiency continue vitamin D 5000 daily  -Osteoporosis/bone density April 2024 patient on Prolia mild joint pains after the injections otherwise controlled and tolerated  --- Hypothyroid improving continue levothyroxine 25 mcg follow-up 6 months  --Hypertension now improved to continue with losartan hydrochlorothiazide no muscle cramping doing well  Follow-up 6 months

## 2024-12-05 NOTE — PROGRESS NOTES
"Subjective   Reason for Visit: Dasha Carvajal is an 69 y.o. female here for a Medicare Wellness visit.     Past Medical, Surgical, and Family History reviewed and updated in chart.    Reviewed all medications by prescribing practitioner or clinical pharmacist (such as prescriptions, OTCs, herbal therapies and supplements) and documented in the medical record.    Annual preventive visit  -Vaccination patient declined vaccines aware of risk and benefit  -Screening mammogram obtained and up-to-date new order placed for April 2025  -Need to proceed with screening colonoscopy as recommended patient did not proceed as recommended, order placed in the EMR.  Osteoporosis left hip continue with vitamin D exercise  Repeat bone density April 2026  - Nicotine dependency  Counseled about smoking cessation \"I spent more 3 minutes counseling patient about need for smoking/tobacco cessation and how I can support efforts when patient is ready to quit.  Discussed nicotine replacement therapy, Varenicline, Bupropion, hypnosis, support groups, and accupunture as potential options.  Patient currently has no signs or symptoms of tobacco related disease.\"  -Screening for lung cancer obtained September 2023 need to repeat in September 2024 follow-up lung nodule stable need to repeat in September 2025    -I discussed smoking history, patient meets criteria for lung cancer screening with low-dose CT scan. By using shared decision making we determined that patient would benefit from that screening including a discussion of benefits and harms of screening, follow-up diagnostic testing, over diagnosis, false positive  rate and total radiation exposure  I counseled the patient about the  importance of adhering to annual lung cancer low-dose CT screening, the impact of comorbidities and his  ability or willingness to undergo diagnosis and treatment if abnormality discovered. I provided patient an order for low-dose CT lung cancer " screening.    Follow-up    -Low-dose CT scanning months if no significant changes need to repeat low-dose CT scan in September 2025  -Hide coronary calcium score  Patient previously had a stress test without significant disease cannot take statin because of statin intolerance now on Repatha injections  Follow-up lipid profile improving  - Hypercholesteremia controlled continue Repatha as recommended continue low-fat diet  -Vitamin D deficiency continue vitamin D 5000 daily  -Osteoporosis/bone density April 2024 patient on Prolia mild joint pains after the injections otherwise controlled and tolerated  --- Hypothyroid improving continue levothyroxine 25 mcg follow-up 6 months  --Hypertension now improved to continue with losartan hydrochlorothiazide no muscle cramping doing well  Follow-up 6 months        Med Refill  Pertinent negatives include no abdominal pain, arthralgias, chest pain, congestion, coughing, fatigue, fever, headaches, rash or sore throat.   Hypertension  Pertinent negatives include no chest pain, headaches or palpitations.   Hyperlipidemia  Pertinent negatives include no chest pain.       Patient Care Team:  Renee Briscoe MD as PCP - General  Renee Briscoe MD as PCP [facility]     Review of Systems   Constitutional:  Negative for fatigue, fever and unexpected weight change.   HENT:  Negative for congestion, ear discharge, ear pain, mouth sores, sinus pain and sore throat.    Eyes:  Negative for visual disturbance.   Respiratory:  Negative for cough and wheezing.    Cardiovascular:  Negative for chest pain, palpitations and leg swelling.   Gastrointestinal:  Negative for abdominal pain, blood in stool and diarrhea.   Genitourinary:  Negative for difficulty urinating.   Musculoskeletal:  Negative for arthralgias.   Skin:  Negative for rash.   Neurological:  Negative for dizziness and headaches.   Hematological:  Does not bruise/bleed easily.   Psychiatric/Behavioral:   Negative for behavioral problems.    All other systems reviewed and are negative.      Objective   Vitals:  There were no vitals taken for this visit.      Physical Exam  Vitals and nursing note reviewed.   Constitutional:       Appearance: Normal appearance.   HENT:      Head: Normocephalic.      Nose: Nose normal.   Eyes:      Conjunctiva/sclera: Conjunctivae normal.      Pupils: Pupils are equal, round, and reactive to light.   Cardiovascular:      Rate and Rhythm: Regular rhythm.   Pulmonary:      Effort: Pulmonary effort is normal.      Breath sounds: Normal breath sounds.   Abdominal:      General: Abdomen is flat.      Palpations: Abdomen is soft.   Musculoskeletal:      Cervical back: Neck supple.   Skin:     General: Skin is warm.   Neurological:      General: No focal deficit present.      Mental Status: She is oriented to person, place, and time.   Psychiatric:         Mood and Affect: Mood normal.         Assessment & Plan  Hypothyroidism, unspecified type    Orders:    levothyroxine (Synthroid, Levoxyl) 25 mcg tablet; Take 1 tablet (25 mcg) by mouth once daily.    Hypertension, unspecified type    Orders:    losartan-hydrochlorothiazide (Hyzaar) 50-12.5 mg tablet; Take 1 tablet by mouth once daily.    Encounter for screening mammogram for malignant neoplasm of breast    Orders:    BI mammo bilateral screening tomosynthesis; Future    Encounter for screening for lung cancer    Orders:    CT lung screening low dose; Future    Nicotine dependence, cigarettes, w unsp disorders    Orders:    CT lung screening low dose; Future    Coronary atherosclerosis due to lipid rich plaque         Hypercholesteremia         High coronary artery calcium score         Cigarette nicotine dependence with nicotine-induced disorder         Statin intolerance         Lung nodules         Screening for colon cancer    Orders:    Cologuard® colon cancer screening; Future    Cologuard® colon cancer screening     Annual preventive  "visit  -Vaccination patient declined vaccines aware of risk and benefit  -Screening mammogram obtained and up-to-date new order placed for April 2025  -Need to proceed with screening colonoscopy as recommended patient did not proceed as recommended, order placed in the EMR.  Osteoporosis left hip continue with vitamin D exercise  Repeat bone density April 2026  - Nicotine dependency  Counseled about smoking cessation \"I spent more 3 minutes counseling patient about need for smoking/tobacco cessation and how I can support efforts when patient is ready to quit.  Discussed nicotine replacement therapy, Varenicline, Bupropion, hypnosis, support groups, and accupunture as potential options.  Patient currently has no signs or symptoms of tobacco related disease.\"  -Screening for lung cancer obtained September 2023 need to repeat in September 2024 follow-up lung nodule stable need to repeat in September 2025    -I discussed smoking history, patient meets criteria for lung cancer screening with low-dose CT scan. By using shared decision making we determined that patient would benefit from that screening including a discussion of benefits and harms of screening, follow-up diagnostic testing, over diagnosis, false positive  rate and total radiation exposure  I counseled the patient about the  importance of adhering to annual lung cancer low-dose CT screening, the impact of comorbidities and his  ability or willingness to undergo diagnosis and treatment if abnormality discovered. I provided patient an order for low-dose CT lung cancer screening.    Follow-up    -Low-dose CT scanning months if no significant changes need to repeat low-dose CT scan in September 2025  -Hide coronary calcium score  Patient previously had a stress test without significant disease cannot take statin because of statin intolerance now on Repatha injections  Follow-up lipid profile improving  - Hypercholesteremia controlled continue Repatha as " recommended continue low-fat diet  -Vitamin D deficiency continue vitamin D 5000 daily  -Osteoporosis/bone density April 2024 patient on Prolia mild joint pains after the injections otherwise controlled and tolerated  --- Hypothyroid improving continue levothyroxine 25 mcg follow-up 6 months  --Hypertension now improved to continue with losartan hydrochlorothiazide no muscle cramping doing well  Follow-up 6 months

## 2024-12-23 ENCOUNTER — SPECIALTY PHARMACY (OUTPATIENT)
Dept: PHARMACY | Facility: CLINIC | Age: 69
End: 2024-12-23

## 2024-12-23 PROCEDURE — RXMED WILLOW AMBULATORY MEDICATION CHARGE

## 2024-12-24 ENCOUNTER — SPECIALTY PHARMACY (OUTPATIENT)
Dept: PHARMACY | Facility: CLINIC | Age: 69
End: 2024-12-24

## 2024-12-24 ENCOUNTER — PHARMACY VISIT (OUTPATIENT)
Dept: PHARMACY | Facility: CLINIC | Age: 69
End: 2024-12-24
Payer: MEDICARE

## 2025-01-05 DIAGNOSIS — E03.9 HYPOTHYROIDISM, UNSPECIFIED TYPE: ICD-10-CM

## 2025-01-06 RX ORDER — LEVOTHYROXINE SODIUM 25 UG/1
25 TABLET ORAL DAILY
Qty: 90 TABLET | Refills: 1 | Status: SHIPPED | OUTPATIENT
Start: 2025-01-06

## 2025-01-17 ENCOUNTER — SPECIALTY PHARMACY (OUTPATIENT)
Dept: PHARMACY | Facility: CLINIC | Age: 70
End: 2025-01-17

## 2025-01-17 PROCEDURE — RXMED WILLOW AMBULATORY MEDICATION CHARGE

## 2025-01-21 ENCOUNTER — PHARMACY VISIT (OUTPATIENT)
Dept: PHARMACY | Facility: CLINIC | Age: 70
End: 2025-01-21
Payer: MEDICARE

## 2025-01-21 ENCOUNTER — SPECIALTY PHARMACY (OUTPATIENT)
Dept: PHARMACY | Facility: CLINIC | Age: 70
End: 2025-01-21

## 2025-02-12 ENCOUNTER — SPECIALTY PHARMACY (OUTPATIENT)
Dept: PHARMACY | Facility: CLINIC | Age: 70
End: 2025-02-12

## 2025-02-25 ENCOUNTER — SPECIALTY PHARMACY (OUTPATIENT)
Dept: PHARMACY | Facility: CLINIC | Age: 70
End: 2025-02-25

## 2025-02-25 ENCOUNTER — PHARMACY VISIT (OUTPATIENT)
Dept: PHARMACY | Facility: CLINIC | Age: 70
End: 2025-02-25
Payer: MEDICARE

## 2025-02-25 PROCEDURE — RXMED WILLOW AMBULATORY MEDICATION CHARGE

## 2025-02-26 ENCOUNTER — PHARMACY VISIT (OUTPATIENT)
Dept: PHARMACY | Facility: CLINIC | Age: 70
End: 2025-02-26
Payer: MEDICARE

## 2025-03-20 PROCEDURE — RXMED WILLOW AMBULATORY MEDICATION CHARGE

## 2025-03-31 ENCOUNTER — LAB (OUTPATIENT)
Dept: LAB | Facility: HOSPITAL | Age: 70
End: 2025-03-31
Payer: MEDICARE

## 2025-03-31 ENCOUNTER — PHARMACY VISIT (OUTPATIENT)
Dept: PHARMACY | Facility: CLINIC | Age: 70
End: 2025-03-31
Payer: MEDICARE

## 2025-03-31 DIAGNOSIS — M81.0 AGE-RELATED OSTEOPOROSIS WITHOUT CURRENT PATHOLOGICAL FRACTURE: ICD-10-CM

## 2025-03-31 LAB
ALBUMIN SERPL BCP-MCNC: 4.6 G/DL (ref 3.4–5)
ALP SERPL-CCNC: 96 U/L (ref 33–136)
ALT SERPL W P-5'-P-CCNC: 96 U/L (ref 7–45)
ANION GAP SERPL CALC-SCNC: 13 MMOL/L (ref 10–20)
AST SERPL W P-5'-P-CCNC: 104 U/L (ref 9–39)
BILIRUB SERPL-MCNC: 0.5 MG/DL (ref 0–1.2)
BUN SERPL-MCNC: 24 MG/DL (ref 6–23)
CALCIUM SERPL-MCNC: 9.8 MG/DL (ref 8.6–10.3)
CHLORIDE SERPL-SCNC: 102 MMOL/L (ref 98–107)
CO2 SERPL-SCNC: 30 MMOL/L (ref 21–32)
CREAT SERPL-MCNC: 1.09 MG/DL (ref 0.5–1.05)
EGFRCR SERPLBLD CKD-EPI 2021: 54 ML/MIN/1.73M*2
GLUCOSE SERPL-MCNC: 102 MG/DL (ref 74–99)
POTASSIUM SERPL-SCNC: 3.8 MMOL/L (ref 3.5–5.3)
PROT SERPL-MCNC: 7.9 G/DL (ref 6.4–8.2)
SODIUM SERPL-SCNC: 141 MMOL/L (ref 136–145)

## 2025-03-31 PROCEDURE — 80053 COMPREHEN METABOLIC PANEL: CPT

## 2025-04-01 ENCOUNTER — OFFICE VISIT (OUTPATIENT)
Dept: PRIMARY CARE | Facility: CLINIC | Age: 70
End: 2025-04-01
Payer: MEDICARE

## 2025-04-01 VITALS
HEART RATE: 83 BPM | SYSTOLIC BLOOD PRESSURE: 170 MMHG | DIASTOLIC BLOOD PRESSURE: 90 MMHG | OXYGEN SATURATION: 97 % | BODY MASS INDEX: 23.72 KG/M2 | WEIGHT: 138.2 LBS | TEMPERATURE: 97.2 F

## 2025-04-01 DIAGNOSIS — F17.219 CIGARETTE NICOTINE DEPENDENCE WITH NICOTINE-INDUCED DISORDER: ICD-10-CM

## 2025-04-01 DIAGNOSIS — I10 HYPERTENSION, UNSPECIFIED TYPE: Primary | ICD-10-CM

## 2025-04-01 DIAGNOSIS — R74.8 ELEVATED LIVER ENZYMES: ICD-10-CM

## 2025-04-01 DIAGNOSIS — K70.10 ALCOHOLIC HEPATITIS WITHOUT ASCITES (MULTI): ICD-10-CM

## 2025-04-01 DIAGNOSIS — F10.11 HISTORY OF ALCOHOL ABUSE: ICD-10-CM

## 2025-04-01 PROCEDURE — 1159F MED LIST DOCD IN RCRD: CPT | Performed by: INTERNAL MEDICINE

## 2025-04-01 PROCEDURE — 3080F DIAST BP >= 90 MM HG: CPT | Performed by: INTERNAL MEDICINE

## 2025-04-01 PROCEDURE — 1160F RVW MEDS BY RX/DR IN RCRD: CPT | Performed by: INTERNAL MEDICINE

## 2025-04-01 PROCEDURE — 3077F SYST BP >= 140 MM HG: CPT | Performed by: INTERNAL MEDICINE

## 2025-04-01 PROCEDURE — 4004F PT TOBACCO SCREEN RCVD TLK: CPT | Performed by: INTERNAL MEDICINE

## 2025-04-01 PROCEDURE — 99214 OFFICE O/P EST MOD 30 MIN: CPT | Performed by: INTERNAL MEDICINE

## 2025-04-01 PROCEDURE — G2211 COMPLEX E/M VISIT ADD ON: HCPCS | Performed by: INTERNAL MEDICINE

## 2025-04-01 RX ORDER — AMLODIPINE BESYLATE 10 MG/1
10 TABLET ORAL
Qty: 30 TABLET | Refills: 2 | Status: SHIPPED | OUTPATIENT
Start: 2025-04-01 | End: 2025-06-30

## 2025-04-01 RX ORDER — AMLODIPINE BESYLATE 5 MG/1
5 TABLET ORAL
COMMUNITY
Start: 2025-03-20 | End: 2025-04-01 | Stop reason: SDUPTHER

## 2025-04-01 RX ORDER — LOSARTAN POTASSIUM AND HYDROCHLOROTHIAZIDE 25; 100 MG/1; MG/1
1 TABLET ORAL
COMMUNITY
Start: 2025-03-13

## 2025-04-01 RX ORDER — NAPROXEN SODIUM 220 MG/1
81 TABLET, FILM COATED ORAL
COMMUNITY

## 2025-04-01 ASSESSMENT — ENCOUNTER SYMPTOMS
FATIGUE: 0
PALPITATIONS: 0
WHEEZING: 0
DIZZINESS: 0
ARTHRALGIAS: 0
UNEXPECTED WEIGHT CHANGE: 0
HYPERTENSION: 1
DIFFICULTY URINATING: 0
SORE THROAT: 0
FEVER: 0
BRUISES/BLEEDS EASILY: 0
DIARRHEA: 0
COUGH: 0
BLOOD IN STOOL: 0
HEADACHES: 0
ABDOMINAL PAIN: 0
SINUS PAIN: 0

## 2025-04-01 NOTE — PROGRESS NOTES
Subjective   Patient ID: Dasha Carvajal is a 69 y.o. female who presents for Hypertension.    - Recently patient came from [place] while having dental extraction appointment patient found to have high blood pressure procedure canceled seen by primary care and [place] patient EKG reviewed sinus rhythm blood work reviewed started on amlodipine 5 mg and losartan changed to losartan 50/12.5 mg daily symptoms did not improve subsequently increase losartan to 100/25 mg daily comes today for evaluation  -Blood work from yesterday reviewed  - Elevated liver enzymes which was to be determined obtain workup for elevated liver enzymes includes abdominal ultrasound  Patient comes about salt restriction alcohol abstinence  -Uncontrolled hypertension increase amlodipine to take 10 mg daily continue losartan 100/25 mg daily  - lOw-dose CT scanning months if no significant changes need to repeat low-dose CT scan in September 2025  -Hide coronary calcium score  Patient previously had a stress test without significant disease cannot take statin because of statin intolerance now on Repatha injections  Follow-up lipid profile improving  - Hypercholesteremia controlled continue Repatha as recommended continue low-fat diet  -Vitamin D deficiency continue vitamin D 5000 daily  -Osteoporosis/bone density April 2024 patient on Prolia mild joint pains after the injections otherwise controlled and tolerated  --- Hypothyroid improving continue levothyroxine 25 mcg follow-up 6 months  Follow-up 4 weeks      Hypertension  Pertinent negatives include no chest pain, headaches or palpitations.          Review of Systems   Constitutional:  Negative for fatigue, fever and unexpected weight change.   HENT:  Negative for congestion, ear discharge, ear pain, mouth sores, sinus pain and sore throat.    Eyes:  Negative for visual disturbance.   Respiratory:  Negative for cough and wheezing.    Cardiovascular:  Negative for chest pain, palpitations  "and leg swelling.   Gastrointestinal:  Negative for abdominal pain, blood in stool and diarrhea.   Genitourinary:  Negative for difficulty urinating.   Musculoskeletal:  Negative for arthralgias.   Skin:  Negative for rash.   Neurological:  Negative for dizziness and headaches.   Hematological:  Does not bruise/bleed easily.   Psychiatric/Behavioral:  Negative for behavioral problems.    All other systems reviewed and are negative.      Objective   No results found for: \"HGBA1C\"   /90   Pulse 83   Temp 36.2 °C (97.2 °F)   Wt 62.7 kg (138 lb 3.2 oz)   SpO2 97%   BMI 23.72 kg/m²   Lab Results   Component Value Date    WBC 8.6 12/04/2023    HGB 13.5 12/04/2023    HCT 41.2 12/04/2023     12/04/2023    CHOL 158 12/04/2024    TRIG 79 12/04/2024    .4 12/04/2024    ALT 96 (H) 03/31/2025     (H) 03/31/2025     03/31/2025    K 3.8 03/31/2025     03/31/2025    CREATININE 1.09 (H) 03/31/2025    BUN 24 (H) 03/31/2025    CO2 30 03/31/2025    TSH 3.60 12/04/2024     par   Physical Exam  Vitals and nursing note reviewed.   Constitutional:       Appearance: Normal appearance.   HENT:      Head: Normocephalic.      Nose: Nose normal.   Eyes:      Conjunctiva/sclera: Conjunctivae normal.      Pupils: Pupils are equal, round, and reactive to light.   Cardiovascular:      Rate and Rhythm: Regular rhythm.   Pulmonary:      Effort: Pulmonary effort is normal.      Breath sounds: Normal breath sounds.   Abdominal:      General: Abdomen is flat.      Palpations: Abdomen is soft.   Musculoskeletal:      Cervical back: Neck supple.   Skin:     General: Skin is warm.   Neurological:      General: No focal deficit present.      Mental Status: She is oriented to person, place, and time.   Psychiatric:         Mood and Affect: Mood normal.         Assessment/Plan   Dasha was seen today for hypertension.  Diagnoses and all orders for this visit:  Hypertension, unspecified type (Primary)  -     " amLODIPine (Norvasc) 10 mg tablet; Take 1 tablet (10 mg) by mouth once daily.  Elevated liver enzymes  -     Hepatitis C Antibody; Future  -     Hepatitis B Core Antibody, Total; Future  -     Hepatitis A Antibody, Total; Future  -     Ferritin; Future  -     Hepatitis B Surface Antibody; Future  -     Hepatic Function Panel; Future  -     US abdomen limited liver; Future  -     Hepatitis C Antibody  -     Hepatitis B Core Antibody, Total  -     Hepatitis A Antibody, Total  -     Ferritin  -     Hepatitis B Surface Antibody  -     Hepatic Function Panel  Alcoholic hepatitis without ascites (Multi)  History of alcohol abuse  Cigarette nicotine dependence with nicotine-induced disorder  Other orders  -     Follow Up In Primary Care - Established; Future    Recently patient came from [place] while having dental extraction appointment patient found to have high blood pressure procedure canceled seen by primary care and [place] patient EKG reviewed sinus rhythm blood work reviewed started on amlodipine 5 mg and losartan changed to losartan 50/12.5 mg daily symptoms did not improve subsequently increase losartan to 100/25 mg daily comes today for evaluation  -Blood work from yesterday reviewed  - Elevated liver enzymes which was to be determined obtain workup for elevated liver enzymes includes abdominal ultrasound  Patient comes about salt restriction alcohol abstinence  -Uncontrolled hypertension increase amlodipine to take 10 mg daily continue losartan 100/25 mg daily  - lOw-dose CT scanning months if no significant changes need to repeat low-dose CT scan in September 2025  -Hide coronary calcium score  Patient previously had a stress test without significant disease cannot take statin because of statin intolerance now on Repatha injections  Follow-up lipid profile improving  - Hypercholesteremia controlled continue Repatha as recommended continue low-fat diet  -Vitamin D deficiency continue vitamin D 5000  daily  -Osteoporosis/bone density April 2024 patient on Prolia mild joint pains after the injections otherwise controlled and tolerated  --- Hypothyroid improving continue levothyroxine 25 mcg follow-up 6 months  Follow-up 4 weeks

## 2025-04-02 ENCOUNTER — INFUSION (OUTPATIENT)
Dept: HEMATOLOGY/ONCOLOGY | Facility: HOSPITAL | Age: 70
End: 2025-04-02
Payer: MEDICARE

## 2025-04-02 VITALS
WEIGHT: 137.79 LBS | SYSTOLIC BLOOD PRESSURE: 145 MMHG | BODY MASS INDEX: 23.52 KG/M2 | OXYGEN SATURATION: 96 % | HEART RATE: 88 BPM | RESPIRATION RATE: 16 BRPM | TEMPERATURE: 95.5 F | HEIGHT: 64 IN | DIASTOLIC BLOOD PRESSURE: 75 MMHG

## 2025-04-02 DIAGNOSIS — M81.0 AGE-RELATED OSTEOPOROSIS WITHOUT CURRENT PATHOLOGICAL FRACTURE: ICD-10-CM

## 2025-04-02 PROCEDURE — 96372 THER/PROPH/DIAG INJ SC/IM: CPT

## 2025-04-02 PROCEDURE — 2500000004 HC RX 250 GENERAL PHARMACY W/ HCPCS (ALT 636 FOR OP/ED): Mod: JZ,TB | Performed by: INTERNAL MEDICINE

## 2025-04-02 RX ORDER — DIPHENHYDRAMINE HYDROCHLORIDE 50 MG/ML
50 INJECTION, SOLUTION INTRAMUSCULAR; INTRAVENOUS AS NEEDED
OUTPATIENT
Start: 2025-09-26

## 2025-04-02 RX ORDER — FAMOTIDINE 10 MG/ML
20 INJECTION, SOLUTION INTRAVENOUS ONCE AS NEEDED
OUTPATIENT
Start: 2025-09-26

## 2025-04-02 RX ORDER — EPINEPHRINE 0.3 MG/.3ML
0.3 INJECTION SUBCUTANEOUS EVERY 5 MIN PRN
OUTPATIENT
Start: 2025-09-26

## 2025-04-02 RX ORDER — ALBUTEROL SULFATE 0.83 MG/ML
3 SOLUTION RESPIRATORY (INHALATION) AS NEEDED
OUTPATIENT
Start: 2025-09-26

## 2025-04-02 RX ADMIN — DENOSUMAB 60 MG: 60 INJECTION SUBCUTANEOUS at 12:39

## 2025-04-02 SDOH — ECONOMIC STABILITY: FOOD INSECURITY: WITHIN THE PAST 12 MONTHS, YOU WORRIED THAT YOUR FOOD WOULD RUN OUT BEFORE YOU GOT MONEY TO BUY MORE.: NEVER TRUE

## 2025-04-02 SDOH — ECONOMIC STABILITY: INCOME INSECURITY: IN THE LAST 12 MONTHS, WAS THERE A TIME WHEN YOU WERE NOT ABLE TO PAY THE MORTGAGE OR RENT ON TIME?: NO

## 2025-04-02 SDOH — ECONOMIC STABILITY: TRANSPORTATION INSECURITY
IN THE PAST 12 MONTHS, HAS THE LACK OF TRANSPORTATION KEPT YOU FROM MEDICAL APPOINTMENTS OR FROM GETTING MEDICATIONS?: NO

## 2025-04-02 SDOH — ECONOMIC STABILITY: FOOD INSECURITY: WITHIN THE PAST 12 MONTHS, THE FOOD YOU BOUGHT JUST DIDN'T LAST AND YOU DIDN'T HAVE MONEY TO GET MORE.: NEVER TRUE

## 2025-04-02 SDOH — ECONOMIC STABILITY: TRANSPORTATION INSECURITY
IN THE PAST 12 MONTHS, HAS LACK OF TRANSPORTATION KEPT YOU FROM MEETINGS, WORK, OR FROM GETTING THINGS NEEDED FOR DAILY LIVING?: NO

## 2025-04-02 ASSESSMENT — PATIENT HEALTH QUESTIONNAIRE - PHQ9
1. LITTLE INTEREST OR PLEASURE IN DOING THINGS: NOT AT ALL
SUM OF ALL RESPONSES TO PHQ9 QUESTIONS 1 & 2: 0
2. FEELING DOWN, DEPRESSED OR HOPELESS: NOT AT ALL

## 2025-04-02 ASSESSMENT — SOCIAL DETERMINANTS OF HEALTH (SDOH)
HOW HARD IS IT FOR YOU TO PAY FOR THE VERY BASICS LIKE FOOD, HOUSING, MEDICAL CARE, AND HEATING?: NOT VERY HARD
IN THE PAST 12 MONTHS, HAS THE ELECTRIC, GAS, OIL, OR WATER COMPANY THREATENED TO SHUT OFF SERVICE IN YOUR HOME?: NO

## 2025-04-02 ASSESSMENT — ENCOUNTER SYMPTOMS
OCCASIONAL FEELINGS OF UNSTEADINESS: 0
LOSS OF SENSATION IN FEET: 0
DEPRESSION: 0

## 2025-04-02 ASSESSMENT — PAIN SCALES - GENERAL: PAINLEVEL_OUTOF10: 0-NO PAIN

## 2025-04-22 PROCEDURE — RXMED WILLOW AMBULATORY MEDICATION CHARGE

## 2025-04-24 ENCOUNTER — HOSPITAL ENCOUNTER (OUTPATIENT)
Dept: RADIOLOGY | Facility: HOSPITAL | Age: 70
Discharge: HOME | End: 2025-04-24
Payer: MEDICARE

## 2025-04-24 ENCOUNTER — SPECIALTY PHARMACY (OUTPATIENT)
Dept: PHARMACY | Facility: CLINIC | Age: 70
End: 2025-04-24

## 2025-04-24 DIAGNOSIS — R74.8 ELEVATED LIVER ENZYMES: ICD-10-CM

## 2025-04-24 PROCEDURE — 76705 ECHO EXAM OF ABDOMEN: CPT

## 2025-04-24 PROCEDURE — 76705 ECHO EXAM OF ABDOMEN: CPT | Performed by: RADIOLOGY

## 2025-04-25 LAB
ALBUMIN SERPL-MCNC: 4.2 G/DL (ref 3.6–5.1)
ALBUMIN/GLOB SERPL: 1.5 (CALC) (ref 1–2.5)
ALP SERPL-CCNC: 85 U/L (ref 37–153)
ALT SERPL-CCNC: 58 U/L (ref 6–29)
AST SERPL-CCNC: 86 U/L (ref 10–35)
BILIRUB DIRECT SERPL-MCNC: 0.1 MG/DL
BILIRUB INDIRECT SERPL-MCNC: 0.3 MG/DL (CALC) (ref 0.2–1.2)
BILIRUB SERPL-MCNC: 0.4 MG/DL (ref 0.2–1.2)
FERRITIN SERPL-MCNC: 483 NG/ML (ref 16–288)
GLOBULIN SER CALC-MCNC: 2.8 G/DL (CALC) (ref 1.9–3.7)
HAV AB SER QL IA: NORMAL
HBV CORE AB SERPL QL IA: NORMAL
HBV SURFACE AB SERPL IA-ACNC: >1000 MIU/ML
HCV AB SERPL QL IA: NORMAL
PROT SERPL-MCNC: 7 G/DL (ref 6.1–8.1)

## 2025-04-28 ENCOUNTER — PHARMACY VISIT (OUTPATIENT)
Dept: PHARMACY | Facility: CLINIC | Age: 70
End: 2025-04-28
Payer: MEDICARE

## 2025-04-28 DIAGNOSIS — R79.89 ELEVATED FERRITIN: Primary | ICD-10-CM

## 2025-05-08 ENCOUNTER — APPOINTMENT (OUTPATIENT)
Dept: PRIMARY CARE | Facility: CLINIC | Age: 70
End: 2025-05-08
Payer: MEDICARE

## 2025-05-08 VITALS
OXYGEN SATURATION: 96 % | WEIGHT: 136 LBS | HEART RATE: 74 BPM | BODY MASS INDEX: 23.56 KG/M2 | SYSTOLIC BLOOD PRESSURE: 102 MMHG | TEMPERATURE: 96.9 F | DIASTOLIC BLOOD PRESSURE: 60 MMHG

## 2025-05-08 DIAGNOSIS — I25.83 CORONARY ATHEROSCLEROSIS DUE TO LIPID RICH PLAQUE: ICD-10-CM

## 2025-05-08 DIAGNOSIS — E03.9 HYPOTHYROIDISM, UNSPECIFIED TYPE: ICD-10-CM

## 2025-05-08 DIAGNOSIS — R79.89 ELEVATED FERRITIN LEVEL: ICD-10-CM

## 2025-05-08 DIAGNOSIS — Z00.00 ROUTINE GENERAL MEDICAL EXAMINATION AT HEALTH CARE FACILITY: Primary | ICD-10-CM

## 2025-05-08 DIAGNOSIS — R74.8 ELEVATED LIVER ENZYMES: ICD-10-CM

## 2025-05-08 DIAGNOSIS — I10 HYPERTENSION, UNSPECIFIED TYPE: ICD-10-CM

## 2025-05-08 DIAGNOSIS — Z78.9 STATIN INTOLERANCE: ICD-10-CM

## 2025-05-08 DIAGNOSIS — F17.219 CIGARETTE NICOTINE DEPENDENCE WITH NICOTINE-INDUCED DISORDER: ICD-10-CM

## 2025-05-08 DIAGNOSIS — K70.10 ALCOHOLIC HEPATITIS WITHOUT ASCITES (MULTI): ICD-10-CM

## 2025-05-08 PROCEDURE — 4004F PT TOBACCO SCREEN RCVD TLK: CPT | Performed by: INTERNAL MEDICINE

## 2025-05-08 PROCEDURE — 99214 OFFICE O/P EST MOD 30 MIN: CPT | Performed by: INTERNAL MEDICINE

## 2025-05-08 PROCEDURE — 1170F FXNL STATUS ASSESSED: CPT | Performed by: INTERNAL MEDICINE

## 2025-05-08 PROCEDURE — 1159F MED LIST DOCD IN RCRD: CPT | Performed by: INTERNAL MEDICINE

## 2025-05-08 PROCEDURE — 3074F SYST BP LT 130 MM HG: CPT | Performed by: INTERNAL MEDICINE

## 2025-05-08 PROCEDURE — 3078F DIAST BP <80 MM HG: CPT | Performed by: INTERNAL MEDICINE

## 2025-05-08 PROCEDURE — 1160F RVW MEDS BY RX/DR IN RCRD: CPT | Performed by: INTERNAL MEDICINE

## 2025-05-08 ASSESSMENT — ENCOUNTER SYMPTOMS
FEVER: 0
BLOOD IN STOOL: 0
ABDOMINAL PAIN: 0
FATIGUE: 0
DIFFICULTY URINATING: 0
ARTHRALGIAS: 0
COUGH: 0
HEADACHES: 0
BRUISES/BLEEDS EASILY: 0
UNEXPECTED WEIGHT CHANGE: 0
DIZZINESS: 0
SINUS PAIN: 0
PALPITATIONS: 0
WHEEZING: 0
SORE THROAT: 0
DIARRHEA: 0

## 2025-05-08 ASSESSMENT — PATIENT HEALTH QUESTIONNAIRE - PHQ9
1. LITTLE INTEREST OR PLEASURE IN DOING THINGS: NOT AT ALL
2. FEELING DOWN, DEPRESSED OR HOPELESS: NOT AT ALL
2. FEELING DOWN, DEPRESSED OR HOPELESS: NOT AT ALL
1. LITTLE INTEREST OR PLEASURE IN DOING THINGS: NOT AT ALL
SUM OF ALL RESPONSES TO PHQ9 QUESTIONS 1 AND 2: 0
SUM OF ALL RESPONSES TO PHQ9 QUESTIONS 1 AND 2: 0

## 2025-05-08 ASSESSMENT — ACTIVITIES OF DAILY LIVING (ADL)
GROCERY_SHOPPING: INDEPENDENT
MANAGING_FINANCES: INDEPENDENT
DRESSING: INDEPENDENT
BATHING: INDEPENDENT
TAKING_MEDICATION: INDEPENDENT
DOING_HOUSEWORK: INDEPENDENT

## 2025-05-08 NOTE — PROGRESS NOTES
"Subjective   Patient ID: Dasha Carvajal is a 69 y.o. female who presents for Medicare Annual Wellness Visit Subsequent (Discuss Prolia, discuss ASPIRIN ).    HPI       Review of Systems    Objective   No results found for: \"HGBA1C\"   /60   Pulse 74   Temp 36.1 °C (96.9 °F)   Wt 61.7 kg (136 lb)   SpO2 96%   BMI 23.56 kg/m²     Physical Exam    Assessment/Plan   There are no diagnoses linked to this encounter.   "

## 2025-05-08 NOTE — PROGRESS NOTES
Subjective   Reason for Visit: Dasha Carvajal is an 69 y.o. female here for a Medicare Wellness visit.     Past Medical, Surgical, and Family History reviewed and updated in chart.    Reviewed all medications by prescribing practitioner or clinical pharmacist (such as prescriptions, OTCs, herbal therapies and supplements) and documented in the medical record.    Annual Medicare physical and preventive visit  -Needs to proceed with screening mammogram as recommended  -Osteoporosis patient need to hold Prolia injection in October to proceed with dental workup need to resume it in November  Repeat bone density in 2026  -Screening for colon cancer up-to-date  -Screening for depression negative  - History of smoking patient need to proceed with low-dose CT scan September 2025    Follow-up  - Elevated liver enzymes and high ferritin levels patient awaiting follow-up hematology family history of with iron storage disorder.  Patient counseled about alcohol abstinence patient drinks 3 to 4 glasses of red wine daily  - Elevated liver enzymes and high ferritin/alcohol use ultrasound showed hepatic steatosis  Counseled about healthy diet alcohol abstinence reevaluate hepatic panel in 3 months  Hypertension improved continue with amlodipine 10 mg losartan hydrochlorothiazide 100/25 mg daily avoid any salt in diet avoid alcohol  --Hide coronary calcium score  Patient previously had a stress test without significant disease cannot take statin because of statin intolerance now on Repatha injections  Follow-up lipid profile improving  - Hypercholesteremia controlled continue Repatha as recommended continue low-fat diet  -Vitamin D deficiency continue vitamin D 5000 daily  -Osteoporosis/bone density April 2024 patient on Prolia mild joint pains after the injections otherwise controlled and tolerated  --- Hypothyroid improving continue levothyroxine 25 mcg follow-up 6 months  Follow-up 3 months          Patient Care Team:  Renee  MARTA Briscoe MD as PCP - General  [facility]     Review of Systems   Constitutional:  Negative for fatigue, fever and unexpected weight change.   HENT:  Negative for congestion, ear discharge, ear pain, mouth sores, sinus pain and sore throat.    Eyes:  Negative for visual disturbance.   Respiratory:  Negative for cough and wheezing.    Cardiovascular:  Negative for chest pain, palpitations and leg swelling.   Gastrointestinal:  Negative for abdominal pain, blood in stool and diarrhea.   Genitourinary:  Negative for difficulty urinating.   Musculoskeletal:  Negative for arthralgias.   Skin:  Negative for rash.   Neurological:  Negative for dizziness and headaches.   Hematological:  Does not bruise/bleed easily.   Psychiatric/Behavioral:  Negative for behavioral problems.    All other systems reviewed and are negative.      Objective   Vitals:  /60   Pulse 74   Temp 36.1 °C (96.9 °F)   Wt 61.7 kg (136 lb)   SpO2 96%   BMI 23.56 kg/m²       Physical Exam  Vitals and nursing note reviewed.   Constitutional:       Appearance: Normal appearance.   HENT:      Head: Normocephalic.      Nose: Nose normal.   Eyes:      Conjunctiva/sclera: Conjunctivae normal.      Pupils: Pupils are equal, round, and reactive to light.   Cardiovascular:      Rate and Rhythm: Regular rhythm.   Pulmonary:      Effort: Pulmonary effort is normal.      Breath sounds: Normal breath sounds.   Abdominal:      General: Abdomen is flat.      Palpations: Abdomen is soft.   Musculoskeletal:      Cervical back: Neck supple.   Skin:     General: Skin is warm.   Neurological:      General: No focal deficit present.      Mental Status: She is oriented to person, place, and time.   Psychiatric:         Mood and Affect: Mood normal.       Lab Results   Component Value Date    WBC 8.6 12/04/2023    HGB 13.5 12/04/2023    HCT 41.2 12/04/2023     12/04/2023    CHOL 158 12/04/2024    TRIG 79  12/04/2024    .4 12/04/2024    ALT 58 (H) 04/24/2025    AST 86 (H) 04/24/2025     03/31/2025    K 3.8 03/31/2025     03/31/2025    CREATININE 1.09 (H) 03/31/2025    BUN 24 (H) 03/31/2025    CO2 30 03/31/2025    TSH 3.60 12/04/2024     par   Assessment & Plan  Routine general medical examination at health care facility    Orders:    1 Year Follow Up In Primary Care - Wellness Exam; Future    Elevated liver enzymes         Alcoholic hepatitis without ascites (Multi)         Hypertension, unspecified type         Cigarette nicotine dependence with nicotine-induced disorder         Hypothyroidism, unspecified type         Coronary atherosclerosis due to lipid rich plaque         Statin intolerance         Elevated ferritin level          Annual Medicare physical and preventive visit  -Needs to proceed with screening mammogram as recommended  -Osteoporosis patient need to hold Prolia injection in October to proceed with dental workup need to resume it in November  Repeat bone density in 2026  -Screening for colon cancer up-to-date  -Screening for depression negative  - History of smoking patient need to proceed with low-dose CT scan September 2025    Follow-up  - Elevated liver enzymes and high ferritin levels patient awaiting follow-up hematology family history of with iron storage disorder.  Patient counseled about alcohol abstinence patient drinks 3 to 4 glasses of red wine daily  - Elevated liver enzymes and high ferritin/alcohol use ultrasound showed hepatic steatosis  Counseled about healthy diet alcohol abstinence reevaluate hepatic panel in 3 months  Hypertension improved continue with amlodipine 10 mg losartan hydrochlorothiazide 100/25 mg daily avoid any salt in diet avoid alcohol  --Hide coronary calcium score  Patient previously had a stress test without significant disease cannot take statin because of statin intolerance now on Repatha injections  Follow-up lipid profile improving  -  Hypercholesteremia controlled continue Repatha as recommended continue low-fat diet  -Vitamin D deficiency continue vitamin D 5000 daily  -Osteoporosis/bone density April 2024 patient on Prolia mild joint pains after the injections otherwise controlled and tolerated  --- Hypothyroid improving continue levothyroxine 25 mcg follow-up 6 months  Follow-up 3 months

## 2025-05-21 PROCEDURE — RXMED WILLOW AMBULATORY MEDICATION CHARGE

## 2025-05-28 ENCOUNTER — PHARMACY VISIT (OUTPATIENT)
Dept: PHARMACY | Facility: CLINIC | Age: 70
End: 2025-05-28
Payer: MEDICARE

## 2025-06-04 ENCOUNTER — APPOINTMENT (OUTPATIENT)
Dept: PRIMARY CARE | Facility: CLINIC | Age: 70
End: 2025-06-04
Payer: MEDICARE

## 2025-06-05 ENCOUNTER — APPOINTMENT (OUTPATIENT)
Dept: PRIMARY CARE | Facility: CLINIC | Age: 70
End: 2025-06-05
Payer: MEDICARE

## 2025-06-06 ENCOUNTER — TELEPHONE (OUTPATIENT)
Dept: HEMATOLOGY/ONCOLOGY | Facility: HOSPITAL | Age: 70
End: 2025-06-06
Payer: MEDICARE

## 2025-06-06 NOTE — TELEPHONE ENCOUNTER
Dasha Carvajal would like to cancel the following appointments:     [facility] ([id number]), 9/29/2025 12:30 PM     Comments:

## 2025-06-09 NOTE — TELEPHONE ENCOUNTER
Reason for Conversation  Appointment    Background   Patient was previously scheduled for next Prolia dose on Monday, 9/29/25 at 12:30 PM. [facility] Reached out to patient to discuss rescheduling. Patient reported that her dentist (located in [place]) advised postponing the injection. Patient stated she travels to [place] for the winter and requested to delay the next Prolia dose until after 4/1/26. Appointment rescheduled to Monday, 4/13/26 at 12:30 PM. Patient verbalized understanding and agreement with the new appointment date and plan during the call.

## 2025-06-12 ENCOUNTER — APPOINTMENT (OUTPATIENT)
Dept: RADIOLOGY | Facility: HOSPITAL | Age: 70
End: 2025-06-12
Payer: MEDICARE

## 2025-06-17 ENCOUNTER — APPOINTMENT (OUTPATIENT)
Dept: RADIOLOGY | Facility: HOSPITAL | Age: 70
End: 2025-06-17
Payer: MEDICARE

## 2025-06-17 VITALS — WEIGHT: 136 LBS | HEIGHT: 64 IN | BODY MASS INDEX: 23.22 KG/M2

## 2025-06-17 DIAGNOSIS — Z12.31 ENCOUNTER FOR SCREENING MAMMOGRAM FOR MALIGNANT NEOPLASM OF BREAST: ICD-10-CM

## 2025-06-17 PROCEDURE — 77067 SCR MAMMO BI INCL CAD: CPT

## 2025-06-17 PROCEDURE — 77067 SCR MAMMO BI INCL CAD: CPT | Performed by: RADIOLOGY

## 2025-06-17 PROCEDURE — 77063 BREAST TOMOSYNTHESIS BI: CPT | Performed by: RADIOLOGY

## 2025-06-24 DIAGNOSIS — I10 HYPERTENSION, UNSPECIFIED TYPE: ICD-10-CM

## 2025-06-24 DIAGNOSIS — M81.0 AGE-RELATED OSTEOPOROSIS WITHOUT CURRENT PATHOLOGICAL FRACTURE: Primary | ICD-10-CM

## 2025-06-24 PROCEDURE — RXMED WILLOW AMBULATORY MEDICATION CHARGE

## 2025-06-24 RX ORDER — AMLODIPINE BESYLATE 10 MG/1
10 TABLET ORAL DAILY
Qty: 90 TABLET | Refills: 0 | Status: SHIPPED | OUTPATIENT
Start: 2025-06-24

## 2025-06-26 ENCOUNTER — PHARMACY VISIT (OUTPATIENT)
Dept: PHARMACY | Facility: CLINIC | Age: 70
End: 2025-06-26
Payer: MEDICARE

## 2025-07-16 ENCOUNTER — LAB (OUTPATIENT)
Dept: LAB | Facility: HOSPITAL | Age: 70
End: 2025-07-16
Payer: MEDICARE

## 2025-07-16 ENCOUNTER — OFFICE VISIT (OUTPATIENT)
Dept: HEMATOLOGY/ONCOLOGY | Facility: HOSPITAL | Age: 70
End: 2025-07-16
Payer: MEDICARE

## 2025-07-16 VITALS
SYSTOLIC BLOOD PRESSURE: 107 MMHG | HEART RATE: 71 BPM | HEIGHT: 64 IN | DIASTOLIC BLOOD PRESSURE: 65 MMHG | OXYGEN SATURATION: 96 % | BODY MASS INDEX: 21.72 KG/M2 | RESPIRATION RATE: 16 BRPM | TEMPERATURE: 97.3 F | WEIGHT: 127.21 LBS

## 2025-07-16 DIAGNOSIS — K76.9 LIVER DISEASE, UNSPECIFIED: ICD-10-CM

## 2025-07-16 DIAGNOSIS — K76.9 LIVER CELL DAMAGE: ICD-10-CM

## 2025-07-16 DIAGNOSIS — R79.89 ELEVATED FERRITIN: Primary | ICD-10-CM

## 2025-07-16 DIAGNOSIS — R79.89 OTHER SPECIFIED ABNORMAL FINDINGS OF BLOOD CHEMISTRY: Primary | ICD-10-CM

## 2025-07-16 DIAGNOSIS — R74.8 ELEVATED LIVER ENZYMES: ICD-10-CM

## 2025-07-16 DIAGNOSIS — R74.8 ABNORMAL LEVELS OF OTHER SERUM ENZYMES: ICD-10-CM

## 2025-07-16 LAB
ALBUMIN SERPL BCP-MCNC: 4.2 G/DL (ref 3.4–5)
ALP SERPL-CCNC: 53 U/L (ref 33–136)
ALT SERPL W P-5'-P-CCNC: 16 U/L (ref 7–45)
ANION GAP SERPL CALC-SCNC: 11 MMOL/L (ref 10–20)
AST SERPL W P-5'-P-CCNC: 24 U/L (ref 9–39)
BASOPHILS # BLD AUTO: 0.03 X10*3/UL (ref 0–0.1)
BASOPHILS NFR BLD AUTO: 0.4 %
BILIRUB SERPL-MCNC: 0.5 MG/DL (ref 0–1.2)
BUN SERPL-MCNC: 17 MG/DL (ref 6–23)
CALCIUM SERPL-MCNC: 9.3 MG/DL (ref 8.6–10.3)
CHLORIDE SERPL-SCNC: 104 MMOL/L (ref 98–107)
CO2 SERPL-SCNC: 28 MMOL/L (ref 21–32)
CREAT SERPL-MCNC: 0.9 MG/DL (ref 0.5–1.05)
EGFRCR SERPLBLD CKD-EPI 2021: 68 ML/MIN/1.73M*2
EOSINOPHIL # BLD AUTO: 0.07 X10*3/UL (ref 0–0.4)
EOSINOPHIL NFR BLD AUTO: 1 %
ERYTHROCYTE [DISTWIDTH] IN BLOOD BY AUTOMATED COUNT: 12.3 % (ref 11.5–14.5)
FERRITIN SERPL-MCNC: 305 NG/ML (ref 8–150)
GLUCOSE SERPL-MCNC: 83 MG/DL (ref 74–99)
HCT VFR BLD AUTO: 37.3 % (ref 36–46)
HGB BLD-MCNC: 12.3 G/DL (ref 12–16)
IMM GRANULOCYTES # BLD AUTO: 0.02 X10*3/UL (ref 0–0.5)
IMM GRANULOCYTES NFR BLD AUTO: 0.3 % (ref 0–0.9)
INR PPP: 1 (ref 0.9–1.1)
IRON SATN MFR SERPL: 26 % (ref 25–45)
IRON SERPL-MCNC: 86 UG/DL (ref 35–150)
LYMPHOCYTES # BLD AUTO: 3.26 X10*3/UL (ref 0.8–3)
LYMPHOCYTES NFR BLD AUTO: 46.3 %
MCH RBC QN AUTO: 31.9 PG (ref 26–34)
MCHC RBC AUTO-ENTMCNC: 33 G/DL (ref 32–36)
MCV RBC AUTO: 97 FL (ref 80–100)
MONOCYTES # BLD AUTO: 0.46 X10*3/UL (ref 0.05–0.8)
MONOCYTES NFR BLD AUTO: 6.5 %
NEUTROPHILS # BLD AUTO: 3.2 X10*3/UL (ref 1.6–5.5)
NEUTROPHILS NFR BLD AUTO: 45.5 %
NRBC BLD-RTO: 0 /100 WBCS (ref 0–0)
PLATELET # BLD AUTO: 316 X10*3/UL (ref 150–450)
POTASSIUM SERPL-SCNC: 4.1 MMOL/L (ref 3.5–5.3)
PROT SERPL-MCNC: 6.5 G/DL (ref 6.4–8.2)
PROTHROMBIN TIME: 10.9 SECONDS (ref 9.8–12.4)
RBC # BLD AUTO: 3.86 X10*6/UL (ref 4–5.2)
SODIUM SERPL-SCNC: 139 MMOL/L (ref 136–145)
TIBC SERPL-MCNC: 337 UG/DL (ref 240–445)
UIBC SERPL-MCNC: 251 UG/DL (ref 110–370)
WBC # BLD AUTO: 7 X10*3/UL (ref 4.4–11.3)

## 2025-07-16 PROCEDURE — 85610 PROTHROMBIN TIME: CPT

## 2025-07-16 PROCEDURE — 1159F MED LIST DOCD IN RCRD: CPT | Performed by: INTERNAL MEDICINE

## 2025-07-16 PROCEDURE — 80053 COMPREHEN METABOLIC PANEL: CPT

## 2025-07-16 PROCEDURE — 3074F SYST BP LT 130 MM HG: CPT | Performed by: INTERNAL MEDICINE

## 2025-07-16 PROCEDURE — 85025 COMPLETE CBC W/AUTO DIFF WBC: CPT

## 2025-07-16 PROCEDURE — 82728 ASSAY OF FERRITIN: CPT

## 2025-07-16 PROCEDURE — 82977 ASSAY OF GGT: CPT

## 2025-07-16 PROCEDURE — 99204 OFFICE O/P NEW MOD 45 MIN: CPT | Performed by: INTERNAL MEDICINE

## 2025-07-16 PROCEDURE — 3008F BODY MASS INDEX DOCD: CPT | Performed by: INTERNAL MEDICINE

## 2025-07-16 PROCEDURE — 1126F AMNT PAIN NOTED NONE PRSNT: CPT | Performed by: INTERNAL MEDICINE

## 2025-07-16 PROCEDURE — 83550 IRON BINDING TEST: CPT

## 2025-07-16 PROCEDURE — 99214 OFFICE O/P EST MOD 30 MIN: CPT | Performed by: INTERNAL MEDICINE

## 2025-07-16 PROCEDURE — 83540 ASSAY OF IRON: CPT

## 2025-07-16 PROCEDURE — 3078F DIAST BP <80 MM HG: CPT | Performed by: INTERNAL MEDICINE

## 2025-07-16 ASSESSMENT — PAIN SCALES - GENERAL: PAINLEVEL_OUTOF10: 0-NO PAIN

## 2025-07-16 ASSESSMENT — ENCOUNTER SYMPTOMS
RESPIRATORY NEGATIVE: 1
CONSTITUTIONAL NEGATIVE: 1
GASTROINTESTINAL NEGATIVE: 1
CARDIOVASCULAR NEGATIVE: 1

## 2025-07-16 NOTE — PROGRESS NOTES
"Patient ID: Dasha Carvajal is a 69 y.o. female.  Referring Physician: Renee Briscoe MD  [address] [place] [address]  Primary Care Provider: Renee Briscoe MD  Referral Reason: Elevated ferritin    Subjective:  She was found to have elevated liver enzymes => Then ferritin was found to be elevated.   Says she has drunk more EtOH than she should in the last 6 mos while in [place]. Now cuttingback.   Her nephew had hemochromatosis. No other FH of blood issues.   She denies having previous iron/blood disorders. No cancers apart from skin cancer. No major surgeries.     Assessment/Plan:  ? Elevated ferritin: It was at 400s. Not significantly high. Should not cause liver damage. Her nephew had hemochromatosis. We will check iron indices today with [facility] and [facility]. If iron indices are all high, would need HFE analysis. Otherwise, would not require furhter work-up or follow-up with us.     Of note, ferritin may be falsely elevated as it is an acute phase reactant.     Review Of Systems:  Review of Systems   Constitutional: Negative.    HENT:  Negative.     Respiratory: Negative.     Cardiovascular: Negative.    Gastrointestinal: Negative.        Physical Exam:  /65 (BP Location: Left arm, Patient Position: Sitting, BP Cuff Size: Adult)   Pulse 71   Temp 36.3 °C (97.3 °F) (Temporal)   Resp 16   Ht 1.626 m (5' 4\")   Wt 57.7 kg (127 lb 3.3 oz)   SpO2 96%   BMI 21.83 kg/m²   BSA: 1.61 meters squared  Performance Status: Asymptomatic  Physical Exam  Constitutional:       Appearance: Normal appearance.   HENT:      Head: Normocephalic and atraumatic.     Eyes:      Pupils: Pupils are equal, round, and reactive to light.       Cardiovascular:      Rate and Rhythm: Normal rate and regular rhythm.   Pulmonary:      Effort: Pulmonary effort is normal.   Abdominal:      General: Abdomen is flat.      Palpations: Abdomen is soft. There is no mass.     Musculoskeletal:      Right lower leg: No edema.      " Left lower leg: No edema.   Lymphadenopathy:      Cervical: No cervical adenopathy.     Skin:     Coloration: Skin is not jaundiced.     Neurological:      General: No focal deficit present.      Mental Status: She is alert and oriented to person, place, and time.         Results:  Diagnostic Results   Lab Results   Component Value Date    WBC 8.6 12/04/2023    HGB 13.5 12/04/2023    HCT 41.2 12/04/2023     (H) 12/04/2023     12/04/2023     Lab Results   Component Value Date    CALCIUM 9.8 03/31/2025     03/31/2025    K 3.8 03/31/2025    CO2 30 03/31/2025     03/31/2025    BUN 24 (H) 03/31/2025    CREATININE 1.09 (H) 03/31/2025    ALT 58 (H) 04/24/2025    AST 86 (H) 04/24/2025     Current Medications[1]     Surgical History[2]  Family History[3]   reports that she has been smoking cigarettes. She has a 40 pack-year smoking history. She has never used smokeless tobacco.  Social History     Socioeconomic History    Marital status: Single     Spouse name: Not on file    Number of children: Not on file    Years of education: Not on file    Highest education level: Not on file   Occupational History    Not on file   Tobacco Use    Smoking status: Every Day     Current packs/day: 1.00     Average packs/day: 1 pack/day for 40.0 years (40.0 ttl pk-yrs)     Types: Cigarettes    Smokeless tobacco: Never   Substance and Sexual Activity    Alcohol use: Not Currently     Alcohol/week: 4.0 - 5.0 standard drinks of alcohol     Types: 4 - 5 Cans of beer per week    Drug use: Never    Sexual activity: Not on file   Other Topics Concern    Not on file   Social History Narrative    Not on file     Social Drivers of Health     Financial Resource Strain: Low Risk  (4/2/2025)    Overall Financial Resource Strain (CARDIA)     Difficulty of Paying Living Expenses: Not very hard   Food Insecurity: No Food Insecurity (4/2/2025)    Hunger Vital Sign     Worried About Running Out of Food in the Last Year: Never true      Ran Out of Food in the Last Year: Never true   Transportation Needs: No Transportation Needs (4/2/2025)    PRAPARE - Transportation     Lack of Transportation (Medical): No     Lack of Transportation (Non-Medical): No   Physical Activity: Not on file   Stress: No Stress Concern Present (4/2/2025)    [employer] - Occupational Stress Questionnaire     Feeling of Stress : Not at all   Social Connections: Not on file   Intimate Partner Violence: Not on file   Housing Stability: Unknown (4/2/2025)    Housing Stability Vital Sign     Unable to Pay for Housing in the Last Year: No     Number of Times Moved in the Last Year: Not on file     Homeless in the Last Year: No       Diagnoses and all orders for this visit:  Elevated ferritin  -     CBC and Auto Differential; Future  -     Comprehensive Metabolic Panel; Future  -     Ferritin; Future  -     Iron and TIBC; Future  -     Gamma-Glutamyl Transferase; Future  -     Protime-INR; Future  Elevated liver enzymes  -     Gamma-Glutamyl Transferase; Future  -     Protime-INR; Future  Liver cell damage  -     Protime-INR; Future  Other orders  -     Referral To Hematology and Oncology       Kimberly Norman MD                              [1]   Current Outpatient Medications:     amLODIPine (Norvasc) 10 mg tablet, Take 1 tablet by mouth once daily, Disp: 90 tablet, Rfl: 0    cholecalciferol (Vitamin D-3) 125 MCG (5000 UT) capsule, Take 1 capsule (125 mcg) by mouth once daily., Disp: 30 capsule, Rfl: 11    evolocumab (Repatha SureClick) 140 mg/mL injection, INJECT 1 ML (140 MG) UNDER THE SKIN EVERY 14 DAYS., Disp: 2 mL, Rfl: 11    fluoride, sodium, (Prevident 5000 Booster) 1.1 % dental paste, USE TOOTHPASTE IN MOUTH TWICE DAILY AFTER REGULAR BRUSHING (DO NOT RINSE), Disp: , Rfl:     latanoprost (Xalatan) 0.005 % ophthalmic solution, INSTILL 1 DROP INTO EACH EYE EVERY DAY AT BEDTIME, Disp: , Rfl:     levothyroxine (Synthroid, Levoxyl) 25 mcg  tablet, Take 1 tablet by mouth once daily, Disp: 90 tablet, Rfl: 1    losartan-hydrochlorothiazide (Hyzaar) 100-25 mg tablet, Take 1 tablet by mouth early in the morning.., Disp: , Rfl:     aspirin 81 mg chewable tablet, Chew 1 tablet (81 mg) once daily. (Patient not taking: Reported on 7/16/2025), Disp: , Rfl:   [2]   Past Surgical History:  Procedure Laterality Date    OTHER SURGICAL HISTORY  10/10/2019    Appendectomy    SKIN CANCER EXCISION     [3] No family history on file.

## 2025-07-17 LAB — GGT SERPL-CCNC: 49 U/L (ref 5–55)

## 2025-07-25 ENCOUNTER — SPECIALTY PHARMACY (OUTPATIENT)
Dept: PHARMACY | Facility: CLINIC | Age: 70
End: 2025-07-25

## 2025-07-25 PROCEDURE — RXMED WILLOW AMBULATORY MEDICATION CHARGE

## 2025-07-30 ENCOUNTER — PHARMACY VISIT (OUTPATIENT)
Dept: PHARMACY | Facility: CLINIC | Age: 70
End: 2025-07-30
Payer: MEDICARE

## 2025-08-07 ENCOUNTER — APPOINTMENT (OUTPATIENT)
Dept: PRIMARY CARE | Facility: CLINIC | Age: 70
End: 2025-08-07
Payer: MEDICARE

## 2025-08-07 VITALS
WEIGHT: 126.6 LBS | OXYGEN SATURATION: 98 % | HEART RATE: 69 BPM | DIASTOLIC BLOOD PRESSURE: 80 MMHG | SYSTOLIC BLOOD PRESSURE: 120 MMHG | TEMPERATURE: 97.3 F | BODY MASS INDEX: 21.73 KG/M2

## 2025-08-07 DIAGNOSIS — M81.0 AGE RELATED OSTEOPOROSIS, UNSPECIFIED PATHOLOGICAL FRACTURE PRESENCE: ICD-10-CM

## 2025-08-07 DIAGNOSIS — E78.00 HYPERCHOLESTEREMIA: ICD-10-CM

## 2025-08-07 DIAGNOSIS — R79.89 ELEVATED FERRITIN LEVEL: ICD-10-CM

## 2025-08-07 DIAGNOSIS — I10 HYPERTENSION, UNSPECIFIED TYPE: ICD-10-CM

## 2025-08-07 DIAGNOSIS — I25.83 CORONARY ATHEROSCLEROSIS DUE TO LIPID RICH PLAQUE: ICD-10-CM

## 2025-08-07 DIAGNOSIS — F17.219 CIGARETTE NICOTINE DEPENDENCE WITH NICOTINE-INDUCED DISORDER: Primary | ICD-10-CM

## 2025-08-07 DIAGNOSIS — R91.8 LUNG NODULES: ICD-10-CM

## 2025-08-07 DIAGNOSIS — F10.11 HISTORY OF ALCOHOL ABUSE: ICD-10-CM

## 2025-08-07 DIAGNOSIS — Z78.9 STATIN INTOLERANCE: ICD-10-CM

## 2025-08-07 DIAGNOSIS — E55.9 VITAMIN D DEFICIENCY: ICD-10-CM

## 2025-08-07 DIAGNOSIS — E03.9 HYPOTHYROIDISM, UNSPECIFIED TYPE: ICD-10-CM

## 2025-08-07 PROCEDURE — 1160F RVW MEDS BY RX/DR IN RCRD: CPT | Performed by: INTERNAL MEDICINE

## 2025-08-07 PROCEDURE — 99214 OFFICE O/P EST MOD 30 MIN: CPT | Performed by: INTERNAL MEDICINE

## 2025-08-07 PROCEDURE — 3074F SYST BP LT 130 MM HG: CPT | Performed by: INTERNAL MEDICINE

## 2025-08-07 PROCEDURE — 1159F MED LIST DOCD IN RCRD: CPT | Performed by: INTERNAL MEDICINE

## 2025-08-07 PROCEDURE — 3079F DIAST BP 80-89 MM HG: CPT | Performed by: INTERNAL MEDICINE

## 2025-08-07 PROCEDURE — G2211 COMPLEX E/M VISIT ADD ON: HCPCS | Performed by: INTERNAL MEDICINE

## 2025-08-07 RX ORDER — LEVOTHYROXINE SODIUM 25 UG/1
25 TABLET ORAL DAILY
Qty: 90 TABLET | Refills: 1 | Status: SHIPPED | OUTPATIENT
Start: 2025-08-07

## 2025-08-07 ASSESSMENT — ENCOUNTER SYMPTOMS
WHEEZING: 0
FATIGUE: 0
BRUISES/BLEEDS EASILY: 0
PALPITATIONS: 0
UNEXPECTED WEIGHT CHANGE: 0
DIARRHEA: 0
BLOOD IN STOOL: 0
DIZZINESS: 0
ABDOMINAL PAIN: 0
FEVER: 0
WOUND: 1
SORE THROAT: 0
HEADACHES: 0
ARTHRALGIAS: 0
HYPERTENSION: 1
SINUS PAIN: 0
DIFFICULTY URINATING: 0
COUGH: 0

## 2025-08-07 NOTE — PROGRESS NOTES
Subjective   Patient ID: Dasha Carvajal is a 69 y.o. female who presents for Hypothyroidism and Hypertension (Discuss medication).    - Recent blood work consultation reviewed  -Patient seen by hematology elevated ferritin due to high consumption of alcohol patient counseled about diet motivation low ferritin diet  Repeat blood work within goal range continue monitor conservatively strong family history of hemochromatosis we will repeat liver enzymes iron studies in 6 months  Follow-up hematology as needed  -Osteoporosis patient need to hold Prolia injection in October to proceed with dental workup need to resume it in November  Repeat bone density in 2026  -Screening for colon cancer up-to-date  - History of smoking patient need to proceed with low-dose CT scan September 2025 awaiting scheduling  -Hepatic steatosis continue low-fat diet continue monitoring liver exam closely  - Patient diagnosed squamous cell cancer of the left hand awaiting surgical surgery next weekCounseled about healthy diet alcohol abstinence reevaluate hepatic panel in 3 months  -Hypertension improved continue with amlodipine 10 mg losartan hydrochlorothiazide 100/25 mg daily avoid any salt in diet avoid alcohol  -High coronary calcium score  Patient previously had a stress test without significant disease cannot take statin because of statin intolerance now on Repatha injections  Follow-up lipid profile improving  - Hypercholesteremia controlled continue Repatha as recommended continue low-fat diet  -Vitamin D deficiency continue vitamin D 5000 daily  -Osteoporosis/bone density April 2024 patient on Prolia mild joint pains after the injections otherwise controlled and tolerated  --- Hypothyroid improving continue levothyroxine 25 mcg follow-up 6 months  Follow-up 3 months      Hypertension  Pertinent negatives include no chest pain, headaches or palpitations.          Review of Systems   Constitutional:  Negative for fatigue, fever and  "unexpected weight change.   HENT:  Negative for congestion, ear discharge, ear pain, mouth sores, sinus pain and sore throat.    Eyes:  Negative for visual disturbance.   Respiratory:  Negative for cough and wheezing.    Cardiovascular:  Negative for chest pain, palpitations and leg swelling.   Gastrointestinal:  Negative for abdominal pain, blood in stool and diarrhea.   Genitourinary:  Negative for difficulty urinating.   Musculoskeletal:  Negative for arthralgias.   Skin:  Positive for wound. Negative for rash.   Neurological:  Negative for dizziness and headaches.   Hematological:  Does not bruise/bleed easily.   Psychiatric/Behavioral:  Negative for behavioral problems.    All other systems reviewed and are negative.      Objective   No results found for: \"HGBA1C\"   /80   Pulse 69   Temp 36.3 °C (97.3 °F)   Wt 57.4 kg (126 lb 9.6 oz)   SpO2 98%   BMI 21.73 kg/m²     Physical Exam  Vitals and nursing note reviewed.   Constitutional:       Appearance: Normal appearance.   HENT:      Head: Normocephalic.      Nose: Nose normal.     Eyes:      Conjunctiva/sclera: Conjunctivae normal.      Pupils: Pupils are equal, round, and reactive to light.       Cardiovascular:      Rate and Rhythm: Regular rhythm.   Pulmonary:      Effort: Pulmonary effort is normal.      Breath sounds: Normal breath sounds.   Abdominal:      General: Abdomen is flat.      Palpations: Abdomen is soft.     Musculoskeletal:      Cervical back: Neck supple.     Skin:     General: Skin is warm.      Findings: Lesion (Left hand squamous cell cancer) present.     Neurological:      General: No focal deficit present.      Mental Status: She is oriented to person, place, and time.     Psychiatric:         Mood and Affect: Mood normal.       Lab Results   Component Value Date    WBC 7.0 07/16/2025    HGB 12.3 07/16/2025    HCT 37.3 07/16/2025     07/16/2025    CHOL 158 12/04/2024    TRIG 79 12/04/2024    .4 12/04/2024    ALT 16 " 07/16/2025    AST 24 07/16/2025     07/16/2025    K 4.1 07/16/2025     07/16/2025    CREATININE 0.90 07/16/2025    BUN 17 07/16/2025    CO2 28 07/16/2025    TSH 3.60 12/04/2024    INR 1.0 07/16/2025     par   Assessment/Plan   Dasha was seen today for hypothyroidism and hypertension.  Diagnoses and all orders for this visit:  Cigarette nicotine dependence with nicotine-induced disorder (Primary)  Hypothyroidism, unspecified type  -     levothyroxine (Synthroid, Levoxyl) 25 mcg tablet; Take 1 tablet (25 mcg) by mouth once daily.  Hypertension, unspecified type  Elevated ferritin level  Lung nodules  Coronary atherosclerosis due to lipid rich plaque  Hypercholesteremia  Statin intolerance  History of alcohol abuse  Vitamin D deficiency  Age related osteoporosis, unspecified pathological fracture presence    Recent blood work consultation reviewed  -Patient seen by hematology elevated ferritin due to high consumption of alcohol patient counseled about diet motivation low ferritin diet  Repeat blood work within goal range continue monitor conservatively strong family history of hemochromatosis we will repeat liver enzymes iron studies in 6 months  Follow-up hematology as needed  -Osteoporosis patient need to hold Prolia injection in October to proceed with dental workup need to resume it in November  Repeat bone density in 2026  -Screening for colon cancer up-to-date  - History of smoking patient need to proceed with low-dose CT scan September 2025 awaiting scheduling  -Hepatic steatosis continue low-fat diet continue monitoring liver exam closely  - Patient diagnosed squamous cell cancer of the left hand awaiting surgical surgery next weekCounseled about healthy diet alcohol abstinence reevaluate hepatic panel in 3 months  -Hypertension improved continue with amlodipine 10 mg losartan hydrochlorothiazide 100/25 mg daily avoid any salt in diet avoid alcohol  -High coronary calcium score  Patient  previously had a stress test without significant disease cannot take statin because of statin intolerance now on Repatha injections  Follow-up lipid profile improving  - Hypercholesteremia controlled continue Repatha as recommended continue low-fat diet  -Vitamin D deficiency continue vitamin D 5000 daily  -Osteoporosis/bone density April 2024 patient on Prolia mild joint pains after the injections otherwise controlled and tolerated  --- Hypothyroid improving continue levothyroxine 25 mcg follow-up 6 months  Follow-up 3 months

## 2025-08-15 ENCOUNTER — SPECIALTY PHARMACY (OUTPATIENT)
Dept: PHARMACY | Facility: CLINIC | Age: 70
End: 2025-08-15

## 2025-08-22 PROCEDURE — RXMED WILLOW AMBULATORY MEDICATION CHARGE

## 2025-08-27 ENCOUNTER — PHARMACY VISIT (OUTPATIENT)
Dept: PHARMACY | Facility: CLINIC | Age: 70
End: 2025-08-27
Payer: MEDICARE

## 2025-11-03 ENCOUNTER — APPOINTMENT (OUTPATIENT)
Dept: PRIMARY CARE | Facility: CLINIC | Age: 70
End: 2025-11-03
Payer: MEDICARE